# Patient Record
Sex: FEMALE | Race: WHITE | Employment: OTHER | URBAN - METROPOLITAN AREA
[De-identification: names, ages, dates, MRNs, and addresses within clinical notes are randomized per-mention and may not be internally consistent; named-entity substitution may affect disease eponyms.]

---

## 2018-01-16 PROBLEM — D35.02 ADENOMA OF LEFT ADRENAL GLAND: Status: ACTIVE | Noted: 2018-01-16

## 2018-01-16 PROBLEM — R91.8 MASS OF LEFT LUNG: Status: ACTIVE | Noted: 2018-01-16

## 2018-01-16 PROBLEM — N63.20 LEFT BREAST MASS: Status: ACTIVE | Noted: 2018-01-16

## 2018-01-16 PROBLEM — D35.01 ADRENAL ADENOMA, RIGHT: Status: ACTIVE | Noted: 2018-01-16

## 2018-01-16 PROBLEM — R94.2 ABNORMAL PFTS: Status: ACTIVE | Noted: 2018-01-16

## 2018-02-19 ENCOUNTER — OFFICE VISIT (OUTPATIENT)
Dept: PULMONOLOGY | Age: 48
End: 2018-02-19
Payer: MEDICARE

## 2018-02-19 VITALS
DIASTOLIC BLOOD PRESSURE: 68 MMHG | RESPIRATION RATE: 16 BRPM | HEART RATE: 95 BPM | HEIGHT: 65 IN | BODY MASS INDEX: 20.16 KG/M2 | OXYGEN SATURATION: 97 % | WEIGHT: 121 LBS | TEMPERATURE: 97.1 F | SYSTOLIC BLOOD PRESSURE: 94 MMHG

## 2018-02-19 VITALS — BODY MASS INDEX: 20.16 KG/M2 | HEART RATE: 95 BPM | WEIGHT: 121 LBS | OXYGEN SATURATION: 96 % | HEIGHT: 65 IN

## 2018-02-19 DIAGNOSIS — J44.9 STAGE 4 VERY SEVERE COPD BY GOLD CLASSIFICATION (HCC): Primary | ICD-10-CM

## 2018-02-19 DIAGNOSIS — J47.9 POST-INFECTIVE BRONCHIECTASIS (HCC): ICD-10-CM

## 2018-02-19 DIAGNOSIS — Z28.21 REFUSED PNEUMOCOCCAL VACCINATION: ICD-10-CM

## 2018-02-19 DIAGNOSIS — R06.02 SHORTNESS OF BREATH: ICD-10-CM

## 2018-02-19 DIAGNOSIS — R91.8 MASS OF LEFT LUNG: Primary | ICD-10-CM

## 2018-02-19 DIAGNOSIS — J44.9 CHRONIC OBSTRUCTIVE PULMONARY DISEASE, UNSPECIFIED COPD TYPE (HCC): ICD-10-CM

## 2018-02-19 DIAGNOSIS — F17.200 SMOKER: ICD-10-CM

## 2018-02-19 PROCEDURE — 3023F SPIROM DOC REV: CPT | Performed by: INTERNAL MEDICINE

## 2018-02-19 PROCEDURE — 94726 PLETHYSMOGRAPHY LUNG VOLUMES: CPT | Performed by: INTERNAL MEDICINE

## 2018-02-19 PROCEDURE — G8926 SPIRO NO PERF OR DOC: HCPCS | Performed by: INTERNAL MEDICINE

## 2018-02-19 PROCEDURE — 94729 DIFFUSING CAPACITY: CPT | Performed by: INTERNAL MEDICINE

## 2018-02-19 PROCEDURE — 4004F PT TOBACCO SCREEN RCVD TLK: CPT | Performed by: INTERNAL MEDICINE

## 2018-02-19 PROCEDURE — 3017F COLORECTAL CA SCREEN DOC REV: CPT | Performed by: INTERNAL MEDICINE

## 2018-02-19 PROCEDURE — G8427 DOCREV CUR MEDS BY ELIG CLIN: HCPCS | Performed by: INTERNAL MEDICINE

## 2018-02-19 PROCEDURE — 94060 EVALUATION OF WHEEZING: CPT | Performed by: INTERNAL MEDICINE

## 2018-02-19 PROCEDURE — G8420 CALC BMI NORM PARAMETERS: HCPCS | Performed by: INTERNAL MEDICINE

## 2018-02-19 PROCEDURE — 99205 OFFICE O/P NEW HI 60 MIN: CPT | Performed by: INTERNAL MEDICINE

## 2018-02-19 PROCEDURE — G8484 FLU IMMUNIZE NO ADMIN: HCPCS | Performed by: INTERNAL MEDICINE

## 2018-02-19 NOTE — PATIENT INSTRUCTIONS
CALLED [facility] IN [place] THEY HAVE PULMONARY REHAB AND THE ORDER, DICTATION AND PFT WAS FAXED TO [facility] [phone]. AND THE ORIGINAL REFERRAL WAS PROVIDED TO THE PATIENT AND THE PATIENT WAS INFORMED TO CALL [facility] AND ASK FOR PULMONARY DEPARTMENT IF SHE DOES NOT HEAR FROM THEM BY 2/23/18. MAIN PHONE NUMBER [phone]  ALSO CALLED [facility] AND SPOKE WITH ELLYN IN XRAY FILE ROOM TO SEND THE PATIENT'S CT AND CXR ON A [address] TO OUR OFFICE.

## 2018-02-20 NOTE — PROGRESS NOTES
Occupational history not working     TOBACCO:   reports that she has been smoking Cigarettes. She has been smoking about 0.25 packs per day. She has never used smokeless tobacco.  ETOH:   reports that she does not drink alcohol. ALLERGIES:      Allergies   Allergen Reactions    Aspirin Hives     Hives, lips swell    Other Hives     Patient is allergic to benedryl         Home Meds:   Prior to Admission medications    Medication Sig Start Date End Date Taking?  Authorizing Provider   Arformoterol Tartrate (BROVANA) 15 MCG/2ML NEBU Take 1 ampule by nebulization 2 times daily   Yes Historical Provider, MD   escitalopram (LEXAPRO) 20 MG tablet Take 20 mg by mouth daily   Yes Historical Provider, MD   budesonide (PULMICORT) 0.5 MG/2ML nebulizer suspension Take 2 mLs by nebulization 2 times daily 1/16/18 2/15/18  Danyelle Stein PA-C   ipratropium-albuterol (DUONEB) 0.5-2.5 (3) MG/3ML SOLN nebulizer solution Inhale 3 mLs into the lungs every 4 hours 1/16/18 2/15/18  Danyelle Stein PA-C   albuterol (PROVENTIL) (2.5 MG/3ML) 0.083% nebulizer solution Take 3 mLs by nebulization 4 times daily 1/16/18 2/15/18  Danyelle Stein PA-C   traZODone (DESYREL) 50 MG tablet Take 150 mg by mouth nightly     Historical Provider, MD              REVIEW OF SYSTEMS:    CONSTITUTIONAL:  negative for  fevers, chills, sweats, fatigue, malaise, anorexia and weight loss  EYES:  negative for  double vision, blurred vision, dry eyes, eye discharge and redness  HEENT:  negative for  hearing loss, tinnitus, ear drainage, earaches and nasal congestion  RESPIRATORY:  See hpi  CARDIOVASCULAR:  negative for  chest pain,, palpitations, orthopnea, PND  GASTROINTESTINAL:  negative for nausea, vomiting, change in bowel habits, diarrhea, constipation, abdominal pain, pruritus, abdominal mass and abdominal distention  GENITOURINARY:  negative for frequency, dysuria, nocturia, urinary incontinence and hesitancy  INTEGUMENT  negative Smoker          :                PLAN:       continue duo neb   Continue brovan and pulmicort   Pulmonary rehab   Ct images   Smoking cessation   Does not qualify for o2   Return 6 months       Requested Prescriptions      No prescriptions requested or ordered in this encounter       Medications Discontinued During This Encounter   Medication Reason    clindamycin (CLEOCIN) 300 MG capsule Discontinued by another clinician       Joanne received counseling on the following healthy behaviors: nutrition, exercise and medication adherence    Patient given educational materials : see patient instruction       Discussed use, benefit, and side effects of prescribed medications. Barriers to medication compliance addressed. All patient questions answered. Pt voiced understanding. I hope this updates you on my evaluation and clinical thinking. Thank you for allowing me to participate in his care. Sincerely,      Mini Ortega MD       Please note that this chart was generated using voice recognition Dragon dictation software. Although every effort was made to ensure the accuracy of this automated transcription, some errors in transcription may have occurred.

## 2018-02-22 ENCOUNTER — TELEPHONE (OUTPATIENT)
Dept: PULMONOLOGY | Age: 48
End: 2018-02-22

## 2018-07-14 PROBLEM — J44.9 COPD (CHRONIC OBSTRUCTIVE PULMONARY DISEASE) (HCC): Status: ACTIVE | Noted: 2018-07-14

## 2018-07-15 ENCOUNTER — HOSPITAL ENCOUNTER (INPATIENT)
Age: 48
LOS: 4 days | Discharge: HOME | DRG: 191 | End: 2018-07-19
Attending: INTERNAL MEDICINE | Admitting: INTERNAL MEDICINE
Payer: MEDICARE

## 2018-07-15 PROBLEM — J44.1 COPD EXACERBATION (HCC): Status: ACTIVE | Noted: 2018-07-14

## 2018-07-15 PROBLEM — R91.8 LUNG INFILTRATE ON CT: Status: ACTIVE | Noted: 2018-07-15

## 2018-07-15 PROBLEM — D75.839 THROMBOCYTOSIS: Status: ACTIVE | Noted: 2018-07-15

## 2018-07-15 PROBLEM — Z87.09 HX OF BRONCHIECTASIS: Status: ACTIVE | Noted: 2018-07-15

## 2018-07-15 PROBLEM — Z86.018 HISTORY OF ADRENAL ADENOMA: Status: ACTIVE | Noted: 2018-07-15

## 2018-07-15 PROBLEM — J47.0 BRONCHIECTASIS WITH ACUTE LOWER RESPIRATORY INFECTION (HCC): Status: ACTIVE | Noted: 2018-07-15

## 2018-07-15 LAB
ABSOLUTE EOS #: 0 K/UL (ref 0–0.4)
ABSOLUTE IMMATURE GRANULOCYTE: 0.71 K/UL (ref 0–0.3)
ABSOLUTE LYMPH #: 0.95 K/UL (ref 1–4.8)
ABSOLUTE MONO #: 0 K/UL (ref 0.1–0.8)
ANION GAP SERPL CALCULATED.3IONS-SCNC: 12 MMOL/L (ref 9–17)
BASOPHILS # BLD: 0 % (ref 0–2)
BASOPHILS ABSOLUTE: 0 K/UL (ref 0–0.2)
BUN BLDV-MCNC: 15 MG/DL (ref 6–20)
BUN/CREAT BLD: ABNORMAL (ref 9–20)
CALCIUM SERPL-MCNC: 8.5 MG/DL (ref 8.6–10.4)
CHLORIDE BLD-SCNC: 94 MMOL/L (ref 98–107)
CO2: 27 MMOL/L (ref 20–31)
CREAT SERPL-MCNC: 0.34 MG/DL (ref 0.5–0.9)
DIFFERENTIAL TYPE: ABNORMAL
DIRECT EXAM: NORMAL
EOSINOPHILS RELATIVE PERCENT: 0 % (ref 1–4)
FERRITIN: 395 UG/L (ref 13–150)
GFR AFRICAN AMERICAN: >60 ML/MIN
GFR NON-AFRICAN AMERICAN: >60 ML/MIN
GFR SERPL CREATININE-BSD FRML MDRD: ABNORMAL ML/MIN/{1.73_M2}
GFR SERPL CREATININE-BSD FRML MDRD: ABNORMAL ML/MIN/{1.73_M2}
GLUCOSE BLD-MCNC: 210 MG/DL (ref 70–99)
HCT VFR BLD CALC: 35.1 % (ref 36.3–47.1)
HEMOGLOBIN: 10.9 G/DL (ref 11.9–15.1)
IMMATURE GRANULOCYTES: 3 %
IRON SATURATION: 17 % (ref 20–55)
IRON: 34 UG/DL (ref 37–145)
LYMPHOCYTES # BLD: 4 % (ref 24–44)
Lab: NORMAL
MCH RBC QN AUTO: 28.4 PG (ref 25.2–33.5)
MCHC RBC AUTO-ENTMCNC: 31.1 G/DL (ref 28.4–34.8)
MCV RBC AUTO: 91.4 FL (ref 82.6–102.9)
MONOCYTES # BLD: 0 % (ref 1–7)
MORPHOLOGY: ABNORMAL
NRBC AUTOMATED: 0 PER 100 WBC
PDW BLD-RTO: 17 % (ref 11.8–14.4)
PLATELET # BLD: 829 K/UL (ref 138–453)
PLATELET ESTIMATE: ABNORMAL
PMV BLD AUTO: 9.2 FL (ref 8.1–13.5)
POTASSIUM SERPL-SCNC: 4.8 MMOL/L (ref 3.7–5.3)
RBC # BLD: 3.84 M/UL (ref 3.95–5.11)
RBC # BLD: ABNORMAL 10*6/UL
SEG NEUTROPHILS: 93 % (ref 36–66)
SEGMENTED NEUTROPHILS ABSOLUTE COUNT: 22.04 K/UL (ref 1.8–7.7)
SODIUM BLD-SCNC: 133 MMOL/L (ref 135–144)
SPECIMEN DESCRIPTION: NORMAL
STATUS: NORMAL
TOTAL IRON BINDING CAPACITY: 202 UG/DL (ref 250–450)
UNSATURATED IRON BINDING CAPACITY: 168 UG/DL (ref 112–347)
WBC # BLD: 23.7 K/UL (ref 3.5–11.3)
WBC # BLD: ABNORMAL 10*3/UL

## 2018-07-15 PROCEDURE — 36415 COLL VENOUS BLD VENIPUNCTURE: CPT

## 2018-07-15 PROCEDURE — 6370000000 HC RX 637 (ALT 250 FOR IP): Performed by: NURSE PRACTITIONER

## 2018-07-15 PROCEDURE — 6360000002 HC RX W HCPCS: Performed by: INTERNAL MEDICINE

## 2018-07-15 PROCEDURE — 94762 N-INVAS EAR/PLS OXIMTRY CONT: CPT

## 2018-07-15 PROCEDURE — 2580000003 HC RX 258: Performed by: NURSE PRACTITIONER

## 2018-07-15 PROCEDURE — 99223 1ST HOSP IP/OBS HIGH 75: CPT | Performed by: INTERNAL MEDICINE

## 2018-07-15 PROCEDURE — 87070 CULTURE OTHR SPECIMN AEROBIC: CPT

## 2018-07-15 PROCEDURE — 97161 PT EVAL LOW COMPLEX 20 MIN: CPT

## 2018-07-15 PROCEDURE — 85025 COMPLETE CBC W/AUTO DIFF WBC: CPT

## 2018-07-15 PROCEDURE — 2060000000 HC ICU INTERMEDIATE R&B

## 2018-07-15 PROCEDURE — G8978 MOBILITY CURRENT STATUS: HCPCS

## 2018-07-15 PROCEDURE — 80048 BASIC METABOLIC PNL TOTAL CA: CPT

## 2018-07-15 PROCEDURE — 97116 GAIT TRAINING THERAPY: CPT

## 2018-07-15 PROCEDURE — 83550 IRON BINDING TEST: CPT

## 2018-07-15 PROCEDURE — 6370000000 HC RX 637 (ALT 250 FOR IP): Performed by: INTERNAL MEDICINE

## 2018-07-15 PROCEDURE — 99222 1ST HOSP IP/OBS MODERATE 55: CPT | Performed by: INTERNAL MEDICINE

## 2018-07-15 PROCEDURE — 82728 ASSAY OF FERRITIN: CPT

## 2018-07-15 PROCEDURE — 94640 AIRWAY INHALATION TREATMENT: CPT

## 2018-07-15 PROCEDURE — 87205 SMEAR GRAM STAIN: CPT

## 2018-07-15 PROCEDURE — 97110 THERAPEUTIC EXERCISES: CPT

## 2018-07-15 PROCEDURE — 87040 BLOOD CULTURE FOR BACTERIA: CPT

## 2018-07-15 PROCEDURE — 6360000002 HC RX W HCPCS: Performed by: NURSE PRACTITIONER

## 2018-07-15 PROCEDURE — 2580000003 HC RX 258: Performed by: INTERNAL MEDICINE

## 2018-07-15 PROCEDURE — G8979 MOBILITY GOAL STATUS: HCPCS

## 2018-07-15 PROCEDURE — 83540 ASSAY OF IRON: CPT

## 2018-07-15 RX ORDER — ACETAMINOPHEN 325 MG/1
650 TABLET ORAL EVERY 4 HOURS PRN
Status: DISCONTINUED | OUTPATIENT
Start: 2018-07-15 | End: 2018-07-19 | Stop reason: HOSPADM

## 2018-07-15 RX ORDER — ALBUTEROL SULFATE 2.5 MG/3ML
2.5 SOLUTION RESPIRATORY (INHALATION)
Status: DISCONTINUED | OUTPATIENT
Start: 2018-07-15 | End: 2018-07-15

## 2018-07-15 RX ORDER — IPRATROPIUM BROMIDE AND ALBUTEROL SULFATE 2.5; .5 MG/3ML; MG/3ML
1 SOLUTION RESPIRATORY (INHALATION) 4 TIMES DAILY
Status: DISCONTINUED | OUTPATIENT
Start: 2018-07-15 | End: 2018-07-19 | Stop reason: HOSPADM

## 2018-07-15 RX ORDER — LORAZEPAM 0.5 MG/1
0.5 TABLET ORAL ONCE
Status: COMPLETED | OUTPATIENT
Start: 2018-07-15 | End: 2018-07-15

## 2018-07-15 RX ORDER — FORMOTEROL FUMARATE 20 UG/2ML
20 SOLUTION RESPIRATORY (INHALATION) 2 TIMES DAILY
Status: DISCONTINUED | OUTPATIENT
Start: 2018-07-15 | End: 2018-07-19 | Stop reason: HOSPADM

## 2018-07-15 RX ORDER — METHYLPREDNISOLONE SODIUM SUCCINATE 125 MG/2ML
80 INJECTION, POWDER, LYOPHILIZED, FOR SOLUTION INTRAMUSCULAR; INTRAVENOUS EVERY 8 HOURS
Status: DISCONTINUED | OUTPATIENT
Start: 2018-07-15 | End: 2018-07-15

## 2018-07-15 RX ORDER — ONDANSETRON 2 MG/ML
4 INJECTION INTRAMUSCULAR; INTRAVENOUS EVERY 6 HOURS PRN
Status: DISCONTINUED | OUTPATIENT
Start: 2018-07-15 | End: 2018-07-19 | Stop reason: HOSPADM

## 2018-07-15 RX ORDER — ALBUTEROL SULFATE 2.5 MG/3ML
2.5 SOLUTION RESPIRATORY (INHALATION) EVERY 4 HOURS PRN
Status: DISCONTINUED | OUTPATIENT
Start: 2018-07-15 | End: 2018-07-15

## 2018-07-15 RX ORDER — NICOTINE 21 MG/24HR
1 PATCH, TRANSDERMAL 24 HOURS TRANSDERMAL DAILY PRN
Status: DISCONTINUED | OUTPATIENT
Start: 2018-07-15 | End: 2018-07-19 | Stop reason: HOSPADM

## 2018-07-15 RX ORDER — IPRATROPIUM BROMIDE AND ALBUTEROL SULFATE 2.5; .5 MG/3ML; MG/3ML
1 SOLUTION RESPIRATORY (INHALATION)
Status: DISCONTINUED | OUTPATIENT
Start: 2018-07-15 | End: 2018-07-15

## 2018-07-15 RX ORDER — ALBUTEROL SULFATE 2.5 MG/3ML
2.5 SOLUTION RESPIRATORY (INHALATION) 2 TIMES DAILY
Status: DISCONTINUED | OUTPATIENT
Start: 2018-07-16 | End: 2018-07-18

## 2018-07-15 RX ORDER — IPRATROPIUM BROMIDE AND ALBUTEROL SULFATE 2.5; .5 MG/3ML; MG/3ML
1 SOLUTION RESPIRATORY (INHALATION) 4 TIMES DAILY
Status: DISCONTINUED | OUTPATIENT
Start: 2018-07-15 | End: 2018-07-15

## 2018-07-15 RX ORDER — SODIUM CHLORIDE 9 MG/ML
INJECTION, SOLUTION INTRAVENOUS CONTINUOUS
Status: DISCONTINUED | OUTPATIENT
Start: 2018-07-15 | End: 2018-07-19 | Stop reason: HOSPADM

## 2018-07-15 RX ORDER — ESCITALOPRAM OXALATE 10 MG/1
20 TABLET ORAL DAILY
Status: DISCONTINUED | OUTPATIENT
Start: 2018-07-15 | End: 2018-07-19 | Stop reason: HOSPADM

## 2018-07-15 RX ORDER — ALBUTEROL SULFATE 2.5 MG/3ML
2.5 SOLUTION RESPIRATORY (INHALATION) EVERY 6 HOURS PRN
Status: DISCONTINUED | OUTPATIENT
Start: 2018-07-15 | End: 2018-07-19 | Stop reason: HOSPADM

## 2018-07-15 RX ORDER — METHYLPREDNISOLONE SODIUM SUCCINATE 40 MG/ML
40 INJECTION, POWDER, LYOPHILIZED, FOR SOLUTION INTRAMUSCULAR; INTRAVENOUS EVERY 8 HOURS
Status: DISCONTINUED | OUTPATIENT
Start: 2018-07-15 | End: 2018-07-19 | Stop reason: HOSPADM

## 2018-07-15 RX ORDER — FAMOTIDINE 20 MG/1
20 TABLET, FILM COATED ORAL 2 TIMES DAILY
Status: DISCONTINUED | OUTPATIENT
Start: 2018-07-15 | End: 2018-07-19 | Stop reason: HOSPADM

## 2018-07-15 RX ADMIN — FAMOTIDINE 20 MG: 20 TABLET, FILM COATED ORAL at 20:49

## 2018-07-15 RX ADMIN — IPRATROPIUM BROMIDE AND ALBUTEROL SULFATE 1 AMPULE: .5; 3 SOLUTION RESPIRATORY (INHALATION) at 08:34

## 2018-07-15 RX ADMIN — LORAZEPAM 0.5 MG: 0.5 TABLET ORAL at 22:22

## 2018-07-15 RX ADMIN — METHYLPREDNISOLONE SODIUM SUCCINATE 80 MG: 125 INJECTION, POWDER, FOR SOLUTION INTRAMUSCULAR; INTRAVENOUS at 10:04

## 2018-07-15 RX ADMIN — FAMOTIDINE 20 MG: 20 TABLET, FILM COATED ORAL at 09:13

## 2018-07-15 RX ADMIN — IPRATROPIUM BROMIDE AND ALBUTEROL SULFATE 1 AMPULE: .5; 3 SOLUTION RESPIRATORY (INHALATION) at 20:19

## 2018-07-15 RX ADMIN — METHYLPREDNISOLONE SODIUM SUCCINATE 80 MG: 125 INJECTION, POWDER, FOR SOLUTION INTRAMUSCULAR; INTRAVENOUS at 03:19

## 2018-07-15 RX ADMIN — FORMOTEROL FUMARATE DIHYDRATE 20 MCG: 20 SOLUTION RESPIRATORY (INHALATION) at 11:47

## 2018-07-15 RX ADMIN — IPRATROPIUM BROMIDE AND ALBUTEROL SULFATE 1 AMPULE: .5; 3 SOLUTION RESPIRATORY (INHALATION) at 15:54

## 2018-07-15 RX ADMIN — IPRATROPIUM BROMIDE AND ALBUTEROL SULFATE 1 AMPULE: .5; 3 SOLUTION RESPIRATORY (INHALATION) at 11:47

## 2018-07-15 RX ADMIN — FORMOTEROL FUMARATE DIHYDRATE 20 MCG: 20 SOLUTION RESPIRATORY (INHALATION) at 20:19

## 2018-07-15 RX ADMIN — SODIUM CHLORIDE 3 G: 900 INJECTION INTRAVENOUS at 22:08

## 2018-07-15 RX ADMIN — SODIUM CHLORIDE: 9 INJECTION, SOLUTION INTRAVENOUS at 03:15

## 2018-07-15 RX ADMIN — METHYLPREDNISOLONE SODIUM SUCCINATE 40 MG: 125 INJECTION, POWDER, FOR SOLUTION INTRAMUSCULAR; INTRAVENOUS at 18:43

## 2018-07-15 RX ADMIN — ESCITALOPRAM 20 MG: 10 TABLET, FILM COATED ORAL at 09:14

## 2018-07-15 RX ADMIN — ALBUTEROL SULFATE 2.5 MG: 2.5 SOLUTION RESPIRATORY (INHALATION) at 23:23

## 2018-07-15 RX ADMIN — SODIUM CHLORIDE 3 G: 900 INJECTION INTRAVENOUS at 13:56

## 2018-07-15 RX ADMIN — ENOXAPARIN SODIUM 40 MG: 100 INJECTION SUBCUTANEOUS at 13:58

## 2018-07-15 NOTE — PROGRESS NOTES
Admission  Patient admitted to room 0422. Patient connected to monitor. Vitals taken. Patient hemodynamically stable. Patient oriented to room. Continue to monitor.

## 2018-07-15 NOTE — PLAN OF CARE
Problem: Falls - Risk of:  Goal: Will remain free from falls  Will remain free from falls   Outcome: Met This Shift  Patient assessed for fall risk and fall precautions initiated as needed. Patient and family instructed about safety devices and allowed to make decisions related to safey. Environment kept free of clutter and adequate lighting provided. Bed in lowest position with brakes locked. Call light in reach. Patient ID band correct and in place. Patient transferred with appropriate methods. Will continue to monitor.   Kimberli Ling RN

## 2018-07-15 NOTE — PROGRESS NOTES
Sandra Zamora, PPatient Assessment complete. COPD (chronic obstructive pulmonary disease) ([facility] [J44.9] . There were no vitals filed for this visit. . Patients home meds are   Prior to Admission medications    Medication Sig Start Date End Date Taking? Authorizing Provider   lurasidone (LATUDA) 40 MG TABS tablet Take 40 mg by mouth daily   Yes Historical Provider, MD   escitalopram (LEXAPRO) 20 MG tablet Take 20 mg by mouth daily   Yes Historical Provider, MD   albuterol (PROVENTIL) (2.5 MG/3ML) 0.083% nebulizer solution Take 3 mLs by nebulization 4 times daily 5/9/18 6/8/18  Rodney Bob MD   budesonide (PULMICORT) 0.5 MG/2ML nebulizer suspension Take 2 mLs by nebulization 2 times daily 5/9/18 6/8/18  Rodney Bob MD   ipratropium-albuterol (DUONEB) 0.5-2.5 (3) MG/3ML SOLN nebulizer solution Inhale 3 mLs into the lungs every 4 hours 5/9/18 6/8/18  Rodney Bob MD   Arformoterol Tartrate (BROVANA) 15 MCG/2ML NEBU Take 1 ampule by nebulization 2 times daily    Historical Provider, MD   traZODone (DESYREL) 50 MG tablet Take 150 mg by mouth nightly     Historical Provider, MD   .  Assessment   Pt resting comfortably and in no distress.  Pt has a HX of COPD Pt states she takes brovana, and albuterol at home    RR 16  Breath Sounds: cl      · Bronchodilator assessment at level  3, home meds  · Hyperinflation assessment at level   · Secretion Management assessment at level    ·   · [x]    Bronchodilator Assessment  BRONCHODILATOR ASSESSMENT SCORE  Score 0 1 2 3 4 5   Breath Sounds   []  Patient Baseline [x]  No Wheeze good aeration []  Faint, scattered wheezing, good aeration []  Expiratory Wheezing and or moderately diminished []  Insp/Exp wheeze and/or very diminished []  Insp/Exp and/ or marked distress   Respiratory Rate   []  Patient Baseline [x]  Less than 20 [x]  Less than 20 []  20-25 []  Greater than 25 []  Greater than 25   Peak flow % of Pred or PB [x]  NA   []  Greater than 90%  []  81-90% []  71-80% []  Less than or equal to 70%  or unable to perform []  Unable due to Respiratory Distress   Dyspnea re []  Patient Baseline [x]  No SOB []  No SOB []  SOB on exertion []  SOB min activity []  At rest/acute   e FEV% Predicted       [x]  NA []  Above 69%  []  Unable []  Above 60-69%  []  Unable []  Above 50-59%  []  Unable []  Above 35-49%  []  Unable []  Less than 35%  []  Unable                 []  Hyperinflation Assessment  Score 1 2 3   CXR and Breath Sounds   []  Clear []  No atelectasis  Basilar aeration []  Atelectasis or absent basilar breath sounds   Incentive Spirometry Volume  (Per IBW)   []  Greater than or equal to 15ml/Kg []  less than 15ml/Kg []  less than 15ml/Kg   Surgery within last 2 weeks []  None or general   []  Abdominal or thoracic surgery  []  Abdominal or thoracic   Chronic Pulmonary Historyre []  No []  Yes []  Yes     []  Secretion Management Assessment  Score 1 2 3   Bilateral Breath Sounds   []  Occasional Rhonchi []  Scattered Rhonchi []  Course Rhonchi and/or poor aeration   Sputum    []  Small amount of thin secretions []  Moderate amount of viscous secretions []  Copius, Viscious Yellow/ Secretions   CXR as reported by physician []  clear  []  Unavailable []  Infiltrates and/or consolidation  []  Unavailable []  Mucus Plugging and or lobar consolidation  []  Unavailable   Cough []  Strong, productive cough []  Weak productive cough []  No cough or weak non-productive cough   Sandra Zamora  4:22 AM                            FEMALE                                  MALE                            FEV1 Predicted Normal Values                        FEV1 Predicted Normal Values          Age                                     Height in Feet and Inches       Age                                     Height in Feet and Inches       4' 11\" 5' 1\" 5' 3\" 5' 5\" 5' 7\" 5' 9\" 5' 11\" 6' 1\"  4' 11\" 5' 1\" 5' 3\" 5' 5\" 5' 7\" 5' 9\" 5' 11\" 6' 1\"   42 - 45 2.49 2.66 2.84 3.03 3.22 3.42 3.62 3.83 42 - 45

## 2018-07-15 NOTE — H&P
MG/3ML) 0.083% nebulizer solution Take 3 mLs by nebulization 4 times daily 5/9/18 6/8/18  Jono Shi MD   budesonide (PULMICORT) 0.5 MG/2ML nebulizer suspension Take 2 mLs by nebulization 2 times daily 5/9/18 6/8/18  Jono Shi MD   ipratropium-albuterol (DUONEB) 0.5-2.5 (3) MG/3ML SOLN nebulizer solution Inhale 3 mLs into the lungs every 4 hours 5/9/18 6/8/18  Jono Shi MD   Arformoterol Tartrate (BROVANA) 15 MCG/2ML NEBU Take 1 ampule by nebulization 2 times daily    Historical Provider, MD   traZODone (DESYREL) 50 MG tablet Take 150 mg by mouth nightly     Historical Provider, MD        Allergies:     Aspirin and Benadryl [diphenhydramine]    Social History:     Tobacco:    reports that she has been smoking Cigarettes. She has been smoking about 0.25 packs per day. She has never used smokeless tobacco.  Alcohol:      reports that she does not drink alcohol. Drug Use:  reports that she does not use drugs. Family History:     Family History   Problem Relation Age of Onset    Cancer Mother 58        Small cell Lung. Passed age 58, smoker     Review of Systems:     Positive and Negative as described in HPI. CONSTITUTIONAL:  negative for fevers, chills, sweats, fatigue, weight loss  HEENT:  negative for vision, hearing changes, runny nose, throat pain  RESPIRATORY: has SOB and coughing.   CARDIOVASCULAR:  negative for chest pain, palpitations  GASTROINTESTINAL:  negative for nausea, vomiting, diarrhea, constipation, change in bowel habits, abdominal pain   GENITOURINARY:  negative for difficulty of urination, burning with urination, frequency   INTEGUMENT:  negative for rash, skin lesions, easy bruising   HEMATOLOGIC/LYMPHATIC:  negative for swelling/edema   ALLERGIC/IMMUNOLOGIC:  negative for urticaria , itching  ENDOCRINE:  negative increase in drinking, increase in urination, hot or cold intolerance  MUSCULOSKELETAL:  negative joint pains, muscle aches, swelling of joints  NEUROLOGICAL:

## 2018-07-15 NOTE — PROGRESS NOTES
Jose  Occupational Therapy Not Seen Note    DATE: 7/15/2018  Name: Daniel Bellamy  : 1970  MRN: [id number]    Patient not available for Occupational Therapy due to:    [] Testing:    [] Hemodialysis    [] Blood Transfusion in Progress    []Refusal by Patient:    [] Surgery/Procedure:    [] Strict Bedrest    [] Sedation    [] Spine Precautions     [] Pt being transferred to palliative care at this time. Spoke with pt/family and OT services to be defered. [x] Pt independent with functional mobility and functional tasks. Pt with no OT acute care needs at this time, will defer OT eval. RN informed.      [] Other    Next Scheduled Treatment: n/a    Signature: Electronically signed by Thania Ortiz OT on 7/15/2018 at 12:31 PM

## 2018-07-15 NOTE — PROGRESS NOTES
assistance  Stand to sit: Contact guard assistance     Ambulation  Ambulation?: Yes  Ambulation 1  Surface: level tile  Device: No Device  Assistance: Contact guard assistance  Distance: 50ft  Comments: SpO2 after gait 87%, recovered with supplemental O2. Reports LE fatigue with gait. Balance  Sitting - Dynamic: Good  Standing - Static: Good  Standing - Dynamic: Good;Fair        Assessment   Assessment: Pt is 45 y/o female with difficulty walking due to SOB and fatigue; will benefit from PT. Treatment Diagnosis: general weakness  Prognosis: Good  Decision Making: Low Complexity  Patient Education: PT eval and POC  REQUIRES PT FOLLOW UP: Yes  Activity Tolerance  Activity Tolerance: Patient Tolerated treatment well         Plan   Plan  Times per week: 5 x week  Times per day: Daily  Current Treatment Recommendations: Strengthening, Balance Training, Functional Mobility Training, Transfer Training, Stair training, Gait Training, Endurance Training, Safety Education & Training, Home Exercise Program, Neuromuscular Re-education  Safety Devices  Type of devices: All fall risk precautions in place    G-Code  PT G-Codes  Functional Assessment Tool Used: [place] Tool  Score: 18  Functional Limitation: Mobility: Walking and moving around  Mobility: Walking and Moving Around Current Status (): At least 20 percent but less than 40 percent impaired, limited or restricted  Mobility: Walking and Moving Around Goal Status (): At least 1 percent but less than 20 percent impaired, limited or restricted                 AM-PAC Score  AM-PAC Inpatient Mobility without Stair Climbing Raw Score : 18  AM-PAC Inpatient without Stair Climbing T-Scale Score : 51.97  Mobility Inpatient CMS 0-100% Score: 23.26  Mobility Inpatient without Stair CMS G-Code Modifier : CJ       Goals  Short term goals  Time Frame for Short term goals: 14 visits  Short term goal 1: Pt to ambulate 300ft without AD and no LOB.    Short term goal 2: Pt to demonstrate good dynamic standing balance. Short term goal 3: Pt to tolerate 20-30 mins ther ex/act for improved strength and endurance. Short term goal 4: Pt to ascend/descend one flight of stairs to prepare for safe return home.    Patient Goals   Patient goals : Home following discharge       Therapy Time   Individual Concurrent Group Co-treatment   Time In 1351         Time Out 1429         Minutes 45                 Millie Dave, PT, DPT, CMPT

## 2018-07-15 NOTE — PLAN OF CARE
Problem: RESPIRATORY  Intervention: Respiratory assessment  DANAE MOE, PPatient Assessment complete. COPD (chronic obstructive pulmonary disease) ([facility] [J44.9] . Vitals:    07/15/18 1203   BP: (!) 100/57   Pulse: 90   Resp: 20   Temp: 96.7 °F (35.9 °C)   SpO2: 92%   . Patients home meds are   Prior to Admission medications    Medication Sig Start Date End Date Taking? Authorizing Provider   lurasidone (LATUDA) 40 MG TABS tablet Take 40 mg by mouth daily   Yes Historical Provider, MD   escitalopram (LEXAPRO) 20 MG tablet Take 20 mg by mouth daily   Yes Historical Provider, MD   albuterol (PROVENTIL) (2.5 MG/3ML) 0.083% nebulizer solution Take 3 mLs by nebulization 4 times daily 5/9/18 6/8/18  Arleth Cross MD   budesonide (PULMICORT) 0.5 MG/2ML nebulizer suspension Take 2 mLs by nebulization 2 times daily 5/9/18 6/8/18  Arleth Cross MD   ipratropium-albuterol (DUONEB) 0.5-2.5 (3) MG/3ML SOLN nebulizer solution Inhale 3 mLs into the lungs every 4 hours 5/9/18 6/8/18  Arleth Cross MD   Arformoterol Tartrate (BROVANA) 15 MCG/2ML NEBU Take 1 ampule by nebulization 2 times daily    Historical Provider, MD   traZODone (DESYREL) 50 MG tablet Take 150 mg by mouth nightly     Historical Provider, MD   .    Assessment     Admit for Pneumonia exacerbation copd  Home meds reviewed. Had issue with congestion. Green yellow sputum Will give acaplla  History of Cavitary Lesions requiring lobectomy    no home oxygen.   Will increase freq of treatments    V1/FVC defer      RR 26    Breath Sounds: wheeze      · Bronchodilator assessment at level  4  · Hyperinflation assessment at level   · Secretion Management assessment at level    ·   · [x]    Bronchodilator Assessment  BRONCHODILATOR ASSESSMENT SCORE  Score 0 1 2 3 4 5   Breath Sounds   []  Patient Baseline []  No Wheeze good aeration []  Faint, scattered wheezing, good aeration []  Expiratory Wheezing and or moderately diminished [x]  Insp/Exp wheeze and/or very

## 2018-07-15 NOTE — PLAN OF CARE
Problem: Falls - Risk of:  Goal: Will remain free from falls  Will remain free from falls   Outcome: Met This Shift  Patient remained free of falls during shift. Bed in lowest position. 2/4 side rails up. Bed breaks locked. Call light and bed side table within reach. Patient calls out appropriately. Continue to monitor.

## 2018-07-15 NOTE — CONSULTS
Inpatient consult to Pulmonology  Consult performed by: Lizzette Gautam ordered by: Mariel Al            Patient Perlita Hidalgo   MRN -  [id number]   Acct # - [de-identified]   - 1970        Date of evaluation -  7/15/2018    REASON FOR THE CONSULTATION:  Pneumonia   HISTORY OF PRESENT ILLNESS:    Steve Aldana is a 46y.o. year old female here for evaluation of worsening fatigue fever sob , cough with green sputum with blood tinged she is also wheezing . She is not on home o2 now is needing 2 l . She has been weak and fatigued since she returned from [address] [place] [address] . She has previous bronchiectasis . Immunization     There is no immunization history on file for this patient. Pneumococcal Vaccine     [] Up to date    [x] Indicated   [] Refused  [] Contraindicated       Influenza Vaccine   [] Up to date    [x] Indicated   [] Refused  [] Contraindicated                PAST MEDICAL HISTORY:       Diagnosis Date    Abnormal PFTs 2018    Adenoma of left adrenal gland 2018    Adrenal adenoma, right 2018    Cavitary pneumonia 10/2016    Right lung    COPD (chronic obstructive pulmonary disease) (HCC)     Mass of left lung 2018    Smoker     3 ppd since 14yr of age         Family History:   Family History   Problem Relation Age of Onset    Cancer Mother 58        Small cell Lung. Passed age 58, smoker       SURGICAL HISTORY:   Past Surgical History:   Procedure Laterality Date    BREAST BIOPSY      \"S\" indicator in left breast to monitor    TONSILLECTOMY      TUBAL LIGATION             SOCIAL AND OCCUPATIONAL HEALTH:  The patient is a Current smoker of @year@. Pack year equal ____. There  is not history of TB or TB exposure. There is not asbestos or silica dust exposure. The patient reports does not have coal, foundry, quarry or [employer] exposure. Travel history reveals No.  There is not  history of recreational or IV drug use.  There is not hot tube exposure. The patient does not bird    Occupational history :individual, not currently working     TOBACCO:   reports that she has been smoking Cigarettes. She has been smoking about 0.25 packs per day. She has never used smokeless tobacco.  ETOH:   reports that she does not drink alcohol. ALLERGIES:    Allergies   Allergen Reactions    Aspirin Anaphylaxis and Hives     Hives, lips swell    Benadryl [Diphenhydramine] Anaphylaxis       Home Meds:   Prior to Admission medications    Medication Sig Start Date End Date Taking?  Authorizing Provider   lurasidone (LATUDA) 40 MG TABS tablet Take 40 mg by mouth daily   Yes Historical Provider, MD   escitalopram (LEXAPRO) 20 MG tablet Take 20 mg by mouth daily   Yes Historical Provider, MD   albuterol (PROVENTIL) (2.5 MG/3ML) 0.083% nebulizer solution Take 3 mLs by nebulization 4 times daily 5/9/18 6/8/18  Rodney Bob MD   budesonide (PULMICORT) 0.5 MG/2ML nebulizer suspension Take 2 mLs by nebulization 2 times daily 5/9/18 6/8/18  Rodney Bob MD   ipratropium-albuterol (DUONEB) 0.5-2.5 (3) MG/3ML SOLN nebulizer solution Inhale 3 mLs into the lungs every 4 hours 5/9/18 6/8/18  Rodney Bob MD   Arformoterol Tartrate (BROVANA) 15 MCG/2ML NEBU Take 1 ampule by nebulization 2 times daily    Historical Provider, MD   traZODone (DESYREL) 50 MG tablet Take 150 mg by mouth nightly     Historical Provider, MD     CURRENT MEDS :  Scheduled Meds:   formoterol  20 mcg Nebulization BID    escitalopram  20 mg Oral Daily    traZODone  150 mg Oral Nightly    famotidine  20 mg Oral BID    enoxaparin  40 mg Subcutaneous Daily    methylPREDNISolone  40 mg Intravenous Q8H    ampicillin-sulbactam  3 g Intravenous Q8H    ipratropium-albuterol  1 ampule Inhalation 4x daily    [START ON 7/16/2018] albuterol  2.5 mg Nebulization BID       Continuous Infusions:   sodium chloride 75 mL/hr at 07/15/18 0315           REVIEW OF SYSTEMS:  CONSTITUTIONAL:  negative for fevers, chills, sweats, fatigue, malaise, anorexia and weight loss  EYES:  negative for  double vision, blurred vision, dry eyes, eye discharge and redness  HEENT:  negative for  hearing loss, tinnitus, ear drainage, earaches and nasal congestion  RESPIRATORY:  See hpi  CARDIOVASCULAR:  negative for  chest pain, palpitations, orthopnea, PND  GASTROINTESTINAL:  negative for nausea, vomiting, change in bowel habits, diarrhea, constipation, abdominal pain, pruritus, abdominal mass and abdominal distention  GENITOURINARY:  negative for frequency, dysuria, nocturia, urinary incontinence and hesitancy  INTEGUMENT/BREAST:  negative for rash, skin lesion(s), dryness, skin color change, changes in lesion, pruritus and changes in hair  HEMATOLOGIC/LYMPHATIC:  negative for easy bruising, bleeding, lymphadenopathy, petechiae and swelling/edema  ALLERGIC/IMMUNOLOGIC:  negative for recurrent infections, urticaria and drug reactions  ENDOCRINE:  negative for heat intolerance, cold intolerance, tremor, weight changes and change in bowel habits  MUSCULOSKELETAL:  negative for  myalgias, arthralgias, pain, joint swelling, stiff joints and decreased range of motion  NEUROLOGICAL:  negative for headaches, dizziness, seizures, memory problems, speech problems, visual disturbance and coordination problems  BEHAVIOR/PSYCH:  negative for poor appetite, increased appetite, decreased sleep, increased sleep, decreased energy level, increased energy level and poor concentration   skin - no rash no dermatitis     Vitals:  /66   Pulse 82   Temp 96.9 °F (36.1 °C) (Temporal)   Resp 19   SpO2 100%     PHYSICAL EXAM:  General Appearance:    Alert, cooperative, mild  distress, appears stated age   Head:    Normocephalic, without obvious abnormality, atraumatic      Eyes:    PERRL, conjunctiva/corneas clear, EOM's intact   Ears:    Normal TM's and external ear canals, both ears   Nose:   Nares normal, septum midline, mucosa normal, no for input(s): INR in the last 72 hours. Thyroid:   Lab Results   Component Value Date    TSH 1.63 01/12/2018     Urinalysis: No results for input(s): BACTERIA, BLOODU, CLARITYU, COLORU, PHUR, PROTEINU, RBCUA, SPECGRAV, BILIRUBINUR, NITRU, WBCUA, LEUKOCYTESUR, GLUCOSEU in the last 72 hours. Cultures:-  Pending     CXR  B/l pulmonary infiltrates     CT Scans  Reviewed       IMPRESSION:    Patient Active Problem List   Diagnosis Code    Adrenal adenoma, right D35.01    Adenoma of left adrenal gland D35.02    Mass of left lung R91.8    Abnormal PFTs R94.2    Left breast mass N63.20    COPD exacerbation (HCC) J44.1    Lung infiltrate on CT R91.8    Hx of bronchiectasis Z87.09    History of adrenal adenoma Z86.2    Thrombocytosis (HCC) D47.3    Bronchiectasis with acute lower respiratory infection ([address] J47.0             Principal Problem:    Bronchiectasis with acute lower respiratory infection (HCC)  Active Problems:    COPD exacerbation (HCC)    Lung infiltrate on CT    Hx of bronchiectasis    History of adrenal adenoma    Thrombocytosis (HCC)  Resolved Problems:    * No resolved hospital problems. *       PLAN:        She is known to me as follows in office has left  Lung saccular bronchiectasis and left pleural thickening from previous mssa infection . cta now shows b/l tree in bud appearence and also filling of bronchiectatic segments and consolidation . She has bronchiectasis with lower respiratory tract infection with reactive lap   Will start Unasyn and get sputum and BC   Cont iv steroids and change to po in am   Will need 4 weeks of antibiotics and repeat ct chest in 4 weeks         I hope this updates you on my evaluation and clinical thinking. Thank you for allowing me to participate in his care.      Sincerely,      Electronically signed by Estelle Lozano MD on 7/15/2018 at 6:03 PM

## 2018-07-15 NOTE — CARE COORDINATION
Case Management Initial Discharge Plan  [facility],         Readmission Risk              Risk of Unplanned Readmission:        8               Met with:patient to discuss discharge plans. Information verified: address, contacts, phone number, , insurance Yes  PCP: Mary Ellen Lawrence MD  Date of last visit:     Insurance Provider: medicare/medicaid    Discharge Planning    Living Arrangements:   (Joelene List)   Support Systems:  Family Members    Home has  stories   stairs to climb to get into front door, stairs to climb to reach second floor  Location of bedroom/bathroom in home     Patient able to perform ADL's:Independent    Current Services (outpatient & in home) none  DME equipment:   DME provider:     Pharmacy: [facility] Medications:  No  Does patient want to participate in local refill/ meds to beds program?  No    Potential Assistance Needed:  N/A    Patient agreeable to home care: No  [place] of choice provided:  n/a    Prior SNF/Rehab Placement and Facility: no  Agreeable to SNF/Rehab: No  [place] of choice provided: n/a   Evaluation: n/a    Expected Discharge date:  18  Patient expects to be discharged to:  home  Follow Up Appointment: Best Day/ Time: Monday PM    Transportation provider: family  Transportation arrangements needed for discharge: No    Discharge Plan: Pt states she lives with her aunt. No DME currently at home. Is on O2 NC-monitor for home O2 needs. Home with family.         Electronically signed by Jose Jang RN on 7/15/18 at 10:57 AM

## 2018-07-16 ENCOUNTER — APPOINTMENT (OUTPATIENT)
Dept: GENERAL RADIOLOGY | Age: 48
DRG: 191 | End: 2018-07-16
Attending: INTERNAL MEDICINE
Payer: MEDICARE

## 2018-07-16 LAB
ABSOLUTE EOS #: 0 K/UL (ref 0–0.4)
ABSOLUTE IMMATURE GRANULOCYTE: 0.83 K/UL (ref 0–0.3)
ABSOLUTE LYMPH #: 1.66 K/UL (ref 1–4.8)
ABSOLUTE MONO #: 0.28 K/UL (ref 0.1–0.8)
ABSOLUTE RETIC #: 0.04 M/UL (ref 0.03–0.08)
BASOPHILS # BLD: 0 % (ref 0–2)
BASOPHILS ABSOLUTE: 0 K/UL (ref 0–0.2)
DIFFERENTIAL TYPE: ABNORMAL
EOSINOPHILS RELATIVE PERCENT: 0 % (ref 1–4)
HCT VFR BLD CALC: 33 % (ref 36.3–47.1)
HEMOGLOBIN: 10.3 G/DL (ref 11.9–15.1)
IMMATURE GRANULOCYTES: 3 %
IMMATURE RETIC FRACT: 30.5 % (ref 2.7–18.3)
LYMPHOCYTES # BLD: 6 % (ref 24–44)
MCH RBC QN AUTO: 28.3 PG (ref 25.2–33.5)
MCHC RBC AUTO-ENTMCNC: 31.2 G/DL (ref 28.4–34.8)
MCV RBC AUTO: 90.7 FL (ref 82.6–102.9)
MONOCYTES # BLD: 1 % (ref 1–7)
MORPHOLOGY: ABNORMAL
NRBC AUTOMATED: 0 PER 100 WBC
PDW BLD-RTO: 16.7 % (ref 11.8–14.4)
PLATELET # BLD: 807 K/UL (ref 138–453)
PLATELET ESTIMATE: ABNORMAL
PMV BLD AUTO: 8.9 FL (ref 8.1–13.5)
RBC # BLD: 3.64 M/UL (ref 3.95–5.11)
RBC # BLD: ABNORMAL 10*6/UL
RETIC %: 1.1 % (ref 0.5–1.9)
RETIC HEMOGLOBIN: 27.3 PG (ref 28.2–35.7)
SEG NEUTROPHILS: 90 % (ref 36–66)
SEGMENTED NEUTROPHILS ABSOLUTE COUNT: 24.93 K/UL (ref 1.8–7.7)
WBC # BLD: 27.7 K/UL (ref 3.5–11.3)
WBC # BLD: ABNORMAL 10*3/UL

## 2018-07-16 PROCEDURE — 99233 SBSQ HOSP IP/OBS HIGH 50: CPT | Performed by: INTERNAL MEDICINE

## 2018-07-16 PROCEDURE — 6360000002 HC RX W HCPCS: Performed by: INTERNAL MEDICINE

## 2018-07-16 PROCEDURE — 85045 AUTOMATED RETICULOCYTE COUNT: CPT

## 2018-07-16 PROCEDURE — 6360000002 HC RX W HCPCS: Performed by: NURSE PRACTITIONER

## 2018-07-16 PROCEDURE — 97116 GAIT TRAINING THERAPY: CPT

## 2018-07-16 PROCEDURE — 94618 PULMONARY STRESS TESTING: CPT

## 2018-07-16 PROCEDURE — 81270 JAK2 GENE: CPT

## 2018-07-16 PROCEDURE — 6370000000 HC RX 637 (ALT 250 FOR IP): Performed by: INTERNAL MEDICINE

## 2018-07-16 PROCEDURE — 94640 AIRWAY INHALATION TREATMENT: CPT

## 2018-07-16 PROCEDURE — 99221 1ST HOSP IP/OBS SF/LOW 40: CPT | Performed by: INTERNAL MEDICINE

## 2018-07-16 PROCEDURE — 2580000003 HC RX 258: Performed by: INTERNAL MEDICINE

## 2018-07-16 PROCEDURE — 94762 N-INVAS EAR/PLS OXIMTRY CONT: CPT

## 2018-07-16 PROCEDURE — 6370000000 HC RX 637 (ALT 250 FOR IP): Performed by: NURSE PRACTITIONER

## 2018-07-16 PROCEDURE — 97110 THERAPEUTIC EXERCISES: CPT

## 2018-07-16 PROCEDURE — 99232 SBSQ HOSP IP/OBS MODERATE 35: CPT | Performed by: INTERNAL MEDICINE

## 2018-07-16 PROCEDURE — 85025 COMPLETE CBC W/AUTO DIFF WBC: CPT

## 2018-07-16 PROCEDURE — 71046 X-RAY EXAM CHEST 2 VIEWS: CPT

## 2018-07-16 PROCEDURE — 2580000003 HC RX 258: Performed by: NURSE PRACTITIONER

## 2018-07-16 PROCEDURE — 36415 COLL VENOUS BLD VENIPUNCTURE: CPT

## 2018-07-16 PROCEDURE — 2060000000 HC ICU INTERMEDIATE R&B

## 2018-07-16 RX ORDER — LANOLIN ALCOHOL/MO/W.PET/CERES
325 CREAM (GRAM) TOPICAL 2 TIMES DAILY WITH MEALS
Status: DISCONTINUED | OUTPATIENT
Start: 2018-07-16 | End: 2018-07-19 | Stop reason: HOSPADM

## 2018-07-16 RX ADMIN — FORMOTEROL FUMARATE DIHYDRATE 20 MCG: 20 SOLUTION RESPIRATORY (INHALATION) at 20:42

## 2018-07-16 RX ADMIN — IPRATROPIUM BROMIDE AND ALBUTEROL SULFATE 1 AMPULE: .5; 3 SOLUTION RESPIRATORY (INHALATION) at 11:37

## 2018-07-16 RX ADMIN — ALBUTEROL SULFATE 2.5 MG: 2.5 SOLUTION RESPIRATORY (INHALATION) at 03:25

## 2018-07-16 RX ADMIN — IPRATROPIUM BROMIDE AND ALBUTEROL SULFATE 1 AMPULE: .5; 3 SOLUTION RESPIRATORY (INHALATION) at 20:35

## 2018-07-16 RX ADMIN — LURASIDONE HYDROCHLORIDE 40 MG: 40 TABLET, FILM COATED ORAL at 09:57

## 2018-07-16 RX ADMIN — SODIUM CHLORIDE 3 G: 900 INJECTION INTRAVENOUS at 20:58

## 2018-07-16 RX ADMIN — FAMOTIDINE 20 MG: 20 TABLET, FILM COATED ORAL at 09:57

## 2018-07-16 RX ADMIN — FORMOTEROL FUMARATE DIHYDRATE 20 MCG: 20 SOLUTION RESPIRATORY (INHALATION) at 08:14

## 2018-07-16 RX ADMIN — SODIUM CHLORIDE 3 G: 900 INJECTION INTRAVENOUS at 05:58

## 2018-07-16 RX ADMIN — FERROUS SULFATE TAB EC 325 MG (65 MG FE EQUIVALENT) 325 MG: 325 (65 FE) TABLET DELAYED RESPONSE at 20:58

## 2018-07-16 RX ADMIN — METHYLPREDNISOLONE SODIUM SUCCINATE 40 MG: 125 INJECTION, POWDER, FOR SOLUTION INTRAMUSCULAR; INTRAVENOUS at 03:38

## 2018-07-16 RX ADMIN — ESCITALOPRAM 20 MG: 10 TABLET, FILM COATED ORAL at 09:57

## 2018-07-16 RX ADMIN — IPRATROPIUM BROMIDE AND ALBUTEROL SULFATE 1 AMPULE: .5; 3 SOLUTION RESPIRATORY (INHALATION) at 08:14

## 2018-07-16 RX ADMIN — SODIUM CHLORIDE: 9 INJECTION, SOLUTION INTRAVENOUS at 03:39

## 2018-07-16 RX ADMIN — IPRATROPIUM BROMIDE AND ALBUTEROL SULFATE 1 AMPULE: .5; 3 SOLUTION RESPIRATORY (INHALATION) at 16:10

## 2018-07-16 RX ADMIN — FAMOTIDINE 20 MG: 20 TABLET, FILM COATED ORAL at 20:58

## 2018-07-16 RX ADMIN — ENOXAPARIN SODIUM 40 MG: 100 INJECTION SUBCUTANEOUS at 09:56

## 2018-07-16 RX ADMIN — SODIUM CHLORIDE 3 G: 900 INJECTION INTRAVENOUS at 14:15

## 2018-07-16 RX ADMIN — METHYLPREDNISOLONE SODIUM SUCCINATE 40 MG: 125 INJECTION, POWDER, FOR SOLUTION INTRAMUSCULAR; INTRAVENOUS at 09:57

## 2018-07-16 RX ADMIN — METHYLPREDNISOLONE SODIUM SUCCINATE 40 MG: 125 INJECTION, POWDER, FOR SOLUTION INTRAMUSCULAR; INTRAVENOUS at 18:07

## 2018-07-16 NOTE — PROGRESS NOTES
Smoking Cessation - topics covered   []  Health Risks  []  Benefits of Quitting   []  Smoking Cessation  []  Patient has no history of tobacco use  []  Patient is former smoker. []  No need for tobacco cessation education. []  Booklet given  [x]  Patient verbalizes understanding. [x]  Patient denies need for tobacco cessation education stating she has gone from smoking 3 packs a day down to 4 cigarettes a day.   Vlad Median  3:23 PM

## 2018-07-16 NOTE — CONSULTS
112 - 347 ug/dL   Ferritin   Result Value Ref Range    Ferritin 395 (H) 13 - 150 ug/L   CBC Auto Differential   Result Value Ref Range    WBC 27.7 (H) 3.5 - 11.3 k/uL    RBC 3.64 (L) 3.95 - 5.11 m/uL    Hemoglobin 10.3 (L) 11.9 - 15.1 g/dL    Hematocrit 33.0 (L) 36.3 - 47.1 %    MCV 90.7 82.6 - 102.9 fL    MCH 28.3 25.2 - 33.5 pg    MCHC 31.2 28.4 - 34.8 g/dL    RDW 16.7 (H) 11.8 - 14.4 %    Platelets 567 (H) 098 - 453 k/uL    MPV 8.9 8.1 - 13.5 fL    NRBC Automated 0.0 0.0 per 100 WBC    Differential Type NOT REPORTED     WBC Morphology NOT REPORTED     RBC Morphology NOT REPORTED     Platelet Estimate NOT REPORTED     Immature Granulocytes 3 (H) 0 %    Seg Neutrophils 90 (H) 36 - 66 %    Lymphocytes 6 (L) 24 - 44 %    Monocytes 1 1 - 7 %    Eosinophils % 0 (L) 1 - 4 %    Basophils 0 0 - 2 %    Absolute Immature Granulocyte 0.83 (H) 0.00 - 0.30 k/uL    Segs Absolute 24.93 (H) 1.8 - 7.7 k/uL    Absolute Lymph # 1.66 1.0 - 4.8 k/uL    Absolute Mono # 0.28 0.1 - 0.8 k/uL    Absolute Eos # 0.00 0.0 - 0.4 k/uL    Basophils # 0.00 0.0 - 0.2 k/uL    Morphology ANISOCYTOSIS PRESENT    Reticulocytes   Result Value Ref Range    Retic % 1.1 0.5 - 1.9 %    Absolute Retic # 0.040 0.030 - 0.080 M/uL    Immature Retic Fract 30.500 (H) 2.7 - 18.3 %    Retic Hemoglobin 27.3 (L) 28.2 - 35.7 pg       Xr Chest Standard (2 Vw)    Result Date: 7/16/2018  EXAMINATION: TWO VIEWS OF THE CHEST 7/16/2018 8:37 am COMPARISON: CT chest from outside institution on 7/14/2018. HISTORY: ORDERING SYSTEM PROVIDED HISTORY: Pneumonia TECHNOLOGIST PROVIDED HISTORY: Reason for exam:->Pneumonia FINDINGS: Cardiac and mediastinal contours are unchanged. Unchanged pleural thickening with cavitary findings, better seen on CT examination. This is likely secondary to differences between CT and radiographic technique. Patchy opacities within the left mid lung appear unchanged. Unchanged reticulonodular pulmonary opacities bilaterally.  No significant pleural follow. Discussed with patient and Nurse. Thank you for asking us to see this patient. Cole Christine MD          This note is created with the assistance of a speech recognition program.  While intending to generate a document that actually reflects the content of the visit, the document can still have some errors including those of syntax and sound a like substitutions which may escape proof reading. It such instances, actual meaning can be extrapolated by contextual diversion.

## 2018-07-16 NOTE — PLAN OF CARE
Problem: Falls - Risk of:  Goal: Will remain free from falls  Will remain free from falls   Outcome: Ongoing  Pt remains free of falls at this time. Bed locked in lowest position, siderails x2, call light in reach. Non-skid footwear applied. Pt ambulates in room with steady gait. Encouraged pt to call for assistance as needed for safety. Will continue to monitor.

## 2018-07-16 NOTE — PLAN OF CARE
Problem: Falls - Risk of:  Goal: Will remain free from falls  Will remain free from falls   Outcome: Met This Shift  Pt. Free of falls this shift. Bed locked in lowest position, side rails up, call light in reach, uses appropriately.

## 2018-07-16 NOTE — PROGRESS NOTES
Physical Therapy  Facility/Department: [address] STEPDOWN  Daily Treatment Note  NAME: Isabel Burnette  : 1970  MRN: [id number]    Date of Service: 2018    Discharge Recommendations:  Home with assist PRN        Patient Diagnosis(es): There were no encounter diagnoses. has a past medical history of Abnormal PFTs; Adenoma of left adrenal gland; Adrenal adenoma, right; Cavitary pneumonia; COPD (chronic obstructive pulmonary disease) ([facility] [address] [facility] Mass of left lung; and Smoker. has a past surgical history that includes Tubal ligation; Tonsillectomy; and Breast biopsy (). Restrictions  Restrictions/Precautions  Restrictions/Precautions: General Precautions, Fall Risk  Subjective   General  Chart Reviewed: Yes  Response To Previous Treatment: Patient with no complaints from previous session. Family / Caregiver Present: No  Subjective  Subjective: Pt and RN agreeable to PT. Pt supine in bed, reports \"I've had a busy morning\". Pleasant and cooperative with treatment  General Comment  Comments: Pt left in bed with call light within reach  Pain Screening  Patient Currently in Pain: Denies  Vital Signs  Patient Currently in Pain: Denies       Orientation  Orientation  Overall Orientation Status: Within Normal Limits  Objective   Bed mobility  Rolling to Left: Supervision  Rolling to Right: Supervision  Supine to Sit: Supervision  Sit to Supine: Supervision  Scooting: Supervision  Transfers  Sit to Stand: Stand by assistance  Stand to sit: Stand by assistance  Comment: good safety awareness  Ambulation  Ambulation?: Yes  Ambulation 1  Surface: level tile  Device: No Device;Rolling Walker  Other Apparatus:  (IV pole)  Assistance: Contact guard assistance  Quality of Gait: slow pace otherwise normal gait  Distance: pt amb ~25 with no AD, requested use of RW d/t fatigue. Amb additional 26' with RW  Comments: pt amb on RA, SpO2 94% after amb.  Pt reports B LE fatigue after amb, no LOB noted  Stairs/Curb  Stairs?: No Concurrent Group Co-treatment   Time In 1024         Time Out 1047         Minutes [address] [place], [place]

## 2018-07-16 NOTE — PROGRESS NOTES
weakness tightness fatigue and increased sleepiness not feeling well along with shortness of breath with greenish sputum production with cough and also had few days of hemoptysis and had chest pain also, on admission her white cell count was elevated and is still 27,000 but some of them now is contributed by Solu-Medrol, she has bilateral expiratory wheezing and she has rhonchi and crackles in the left lower mid lung. CT scan of the chest shows areas of fibrotic scarring and cavitation on the left side in upper lung and also bronchiectatic and fibrotic changes in left lower lung field she also has bronchiectasis present on the right side CT scan is not much change as compared to CT scan in January this year and most likely have an infection and bronchiectasis exacerbation with COPD/emphysema exacerbation. Clinically she is doing better her appetite is better she claims that she has no hemoptysis, no chest pain and her shortness of breath is improved since admission so she is responding to Unasyn sputum culture so far is negative and need follow-up final results. Continue with IV antibiotics at this time and she will need longer course of antibiotic. Although final culture results. Follow up clinically and WBC count. May change IV steroids to oral prednisone tomorrow. Continue with bronchodilators therapy.     Discussed with Dr. Jey Johnson MD  7/16/2018 5:21 PM

## 2018-07-16 NOTE — PLAN OF CARE
Problem: RESPIRATORY  Intervention: Respiratory assessment  [facility]. COPD (chronic obstructive pulmonary disease) ([facility] [J44.9] . Vitals:    07/16/18 0330   BP: 106/67   Pulse: 88   Resp: 22   Temp: 97.5 °F (36.4 °C)   SpO2: 100%   . Patients home meds are   Prior to Admission medications    Medication Sig Start Date End Date Taking?  Authorizing Provider   lurasidone (LATUDA) 40 MG TABS tablet Take 40 mg by mouth daily   Yes Historical Provider, MD   escitalopram (LEXAPRO) 20 MG tablet Take 20 mg by mouth daily   Yes Historical Provider, MD   albuterol (PROVENTIL) (2.5 MG/3ML) 0.083% nebulizer solution Take 3 mLs by nebulization 4 times daily 5/9/18 6/8/18 Gaylene Delay, MD   budesonide (PULMICORT) 0.5 MG/2ML nebulizer suspension Take 2 mLs by nebulization 2 times daily 5/9/18 6/8/18 Gaylene Delay, MD   ipratropium-albuterol (DUONEB) 0.5-2.5 (3) MG/3ML SOLN nebulizer solution Inhale 3 mLs into the lungs every 4 hours 5/9/18 6/8/18 Gaylene Delay, MD   Arformoterol Tartrate (BROVANA) 15 MCG/2ML NEBU Take 1 ampule by nebulization 2 times daily    Historical Provider, MD   traZODone (DESYREL) 50 MG tablet Take 150 mg by mouth nightly     Historical Provider, MD . Marylene Laura for Pneumonia exacerbation copd        History of Cavitary Lesions requiring lobectomy               RR 26    Breath Sounds: wheeze      · Bronchodilator assessment at level  4  · Hyperinflation assessment at level   · Secretion Management assessment at level    ·   · [x]    Bronchodilator Assessment  BRONCHODILATOR ASSESSMENT SCORE  Score 0 1 2 3 4 5   Breath Sounds   []  Patient Baseline []  No Wheeze good aeration []  Faint, scattered wheezing, good aeration []  Expiratory Wheezing and or moderately diminished [x]  Insp/Exp wheeze and/or very diminished []  Insp/Exp and/ or marked distress   Respiratory Rate   []  Patient Baseline []  Less than 20 []  Less than 20 []  20-25 [x]  Greater than 25 []  Greater than 25   Peak flow % of Pred or PB []  NA   []  Greater than 90%  []  81-90% []  71-80% []  Less than or equal to 70%  or unable to perform []  Unable due to Respiratory Distress   Dyspnea re []  Patient Baseline []  No SOB []  No SOB []  SOB on exertion [x]  SOB min activity []  At rest/acute   e FEV% Predicted       [x]  NA []  Above 69%  []  Unable []  Above 60-69%  []  Unable []  Above 50-59%  []  Unable []  Above 35-49%  []  Unable []  Less than 35%  []  Unable                 []  Hyperinflation Assessment  Score 1 2 3   CXR and Breath Sounds   []  Clear []  No atelectasis  Basilar aeration []  Atelectasis or absent basilar breath sounds   Incentive Spirometry Volume  (Per IBW)   []  Greater than or equal to 15ml/Kg []  less than 15ml/Kg []  less than 15ml/Kg   Surgery within last 2 weeks []  None or general   []  Abdominal or thoracic surgery  []  Abdominal or thoracic   Chronic Pulmonary Historyre []  No []  Yes []  Yes     []  Secretion Management Assessment  Score 1 2 3   Bilateral Breath Sounds   []  Occasional Rhonchi []  Scattered Rhonchi []  Course Rhonchi and/or poor aeration   Sputum    []  Small amount of thin secretions []  Moderate amount of viscous secretions []  Copius, Viscious Yellow/ Secretions   CXR as reported by physician []  clear  []  Unavailable []  Infiltrates and/or consolidation  []  Unavailable []  Mucus Plugging and or lobar consolidation  []  Unavailable   Cough []  Strong, productive cough []  Weak productive cough []  No cough or weak non-productive cough   BERENICE VIZCAINO JOANA  8:16 AM                            FEMALE                                  MALE                            FEV1 Predicted Normal Values                        FEV1 Predicted Normal Values          Age                                     Height in Feet and Inches       Age                                     Height in Feet and Inches       4' 11\" 5' 1\" 5' 3\" 5' 5\" 5' 7\" 5' 9\" 5' 11\" 6' 1\"  4' 11\" 390 419 448 [phone] 562             Intervention: Administer treatments as ordered  BRONCHOSPASM/BRONCHOCONSTRICTION     [x]         IMPROVE AERATION/BREATH SOUNDS  [x]   ADMINISTER BRONCHODILATOR THERAPY AS APPROPRIATE  [x]   ASSESS BREATH SOUNDS  [x]   IMPLEMENT AEROSOL/MDI PROTOCOL  [x]   PATIENT EDUCATION AS NEEDED      Intervention: Education, Medication and treatments    AIDET method used to introduce myself to patient and or parent in room    Patient and or Parent educated on current respiratory medication and modalities   administered. Possible side effects of medications discussed. Questions answered. Patient and or Parent accepts Daily POC and treatment plan.

## 2018-07-16 NOTE — CARE COORDINATION
Qualifies for home O2 with exercise. Plan is to discharge to home. Need face to face and order from attending. Plan is to return to home with family.

## 2018-07-17 LAB
CULTURE: ABNORMAL
DIRECT EXAM: ABNORMAL
Lab: ABNORMAL
SPECIMEN DESCRIPTION: ABNORMAL
STATUS: ABNORMAL
SURGICAL PATHOLOGY REPORT: NORMAL

## 2018-07-17 PROCEDURE — 6360000002 HC RX W HCPCS: Performed by: INTERNAL MEDICINE

## 2018-07-17 PROCEDURE — 99232 SBSQ HOSP IP/OBS MODERATE 35: CPT | Performed by: INTERNAL MEDICINE

## 2018-07-17 PROCEDURE — 6370000000 HC RX 637 (ALT 250 FOR IP): Performed by: INTERNAL MEDICINE

## 2018-07-17 PROCEDURE — 6360000002 HC RX W HCPCS: Performed by: NURSE PRACTITIONER

## 2018-07-17 PROCEDURE — 97116 GAIT TRAINING THERAPY: CPT

## 2018-07-17 PROCEDURE — 2580000003 HC RX 258: Performed by: NURSE PRACTITIONER

## 2018-07-17 PROCEDURE — 99233 SBSQ HOSP IP/OBS HIGH 50: CPT | Performed by: INTERNAL MEDICINE

## 2018-07-17 PROCEDURE — 2060000000 HC ICU INTERMEDIATE R&B

## 2018-07-17 PROCEDURE — 94762 N-INVAS EAR/PLS OXIMTRY CONT: CPT

## 2018-07-17 PROCEDURE — 2580000003 HC RX 258: Performed by: INTERNAL MEDICINE

## 2018-07-17 PROCEDURE — 6370000000 HC RX 637 (ALT 250 FOR IP): Performed by: NURSE PRACTITIONER

## 2018-07-17 PROCEDURE — 97110 THERAPEUTIC EXERCISES: CPT

## 2018-07-17 PROCEDURE — 94640 AIRWAY INHALATION TREATMENT: CPT

## 2018-07-17 RX ADMIN — IPRATROPIUM BROMIDE AND ALBUTEROL SULFATE 1 AMPULE: .5; 3 SOLUTION RESPIRATORY (INHALATION) at 15:51

## 2018-07-17 RX ADMIN — LURASIDONE HYDROCHLORIDE 40 MG: 40 TABLET, FILM COATED ORAL at 08:52

## 2018-07-17 RX ADMIN — FORMOTEROL FUMARATE DIHYDRATE 20 MCG: 20 SOLUTION RESPIRATORY (INHALATION) at 09:37

## 2018-07-17 RX ADMIN — ALBUTEROL SULFATE 2.5 MG: 2.5 SOLUTION RESPIRATORY (INHALATION) at 03:51

## 2018-07-17 RX ADMIN — ENOXAPARIN SODIUM 40 MG: 100 INJECTION SUBCUTANEOUS at 08:52

## 2018-07-17 RX ADMIN — METHYLPREDNISOLONE SODIUM SUCCINATE 40 MG: 125 INJECTION, POWDER, FOR SOLUTION INTRAMUSCULAR; INTRAVENOUS at 17:56

## 2018-07-17 RX ADMIN — SODIUM CHLORIDE 3 G: 900 INJECTION INTRAVENOUS at 06:35

## 2018-07-17 RX ADMIN — ESCITALOPRAM 20 MG: 10 TABLET, FILM COATED ORAL at 08:51

## 2018-07-17 RX ADMIN — FERROUS SULFATE TAB EC 325 MG (65 MG FE EQUIVALENT) 325 MG: 325 (65 FE) TABLET DELAYED RESPONSE at 08:52

## 2018-07-17 RX ADMIN — IPRATROPIUM BROMIDE AND ALBUTEROL SULFATE 1 AMPULE: .5; 3 SOLUTION RESPIRATORY (INHALATION) at 09:37

## 2018-07-17 RX ADMIN — METHYLPREDNISOLONE SODIUM SUCCINATE 40 MG: 125 INJECTION, POWDER, FOR SOLUTION INTRAMUSCULAR; INTRAVENOUS at 10:21

## 2018-07-17 RX ADMIN — SODIUM CHLORIDE 3 G: 900 INJECTION INTRAVENOUS at 13:51

## 2018-07-17 RX ADMIN — FAMOTIDINE 20 MG: 20 TABLET, FILM COATED ORAL at 20:58

## 2018-07-17 RX ADMIN — FAMOTIDINE 20 MG: 20 TABLET, FILM COATED ORAL at 08:51

## 2018-07-17 RX ADMIN — SODIUM CHLORIDE 3 G: 900 INJECTION INTRAVENOUS at 20:58

## 2018-07-17 RX ADMIN — IPRATROPIUM BROMIDE AND ALBUTEROL SULFATE 1 AMPULE: .5; 3 SOLUTION RESPIRATORY (INHALATION) at 20:31

## 2018-07-17 RX ADMIN — METHYLPREDNISOLONE SODIUM SUCCINATE 40 MG: 125 INJECTION, POWDER, FOR SOLUTION INTRAMUSCULAR; INTRAVENOUS at 04:06

## 2018-07-17 RX ADMIN — FERROUS SULFATE TAB EC 325 MG (65 MG FE EQUIVALENT) 325 MG: 325 (65 FE) TABLET DELAYED RESPONSE at 17:56

## 2018-07-17 RX ADMIN — IPRATROPIUM BROMIDE AND ALBUTEROL SULFATE 1 AMPULE: .5; 3 SOLUTION RESPIRATORY (INHALATION) at 11:49

## 2018-07-17 RX ADMIN — ALBUTEROL SULFATE 2.5 MG: 2.5 SOLUTION RESPIRATORY (INHALATION) at 00:11

## 2018-07-17 RX ADMIN — SODIUM CHLORIDE: 9 INJECTION, SOLUTION INTRAVENOUS at 08:51

## 2018-07-17 RX ADMIN — SODIUM CHLORIDE: 9 INJECTION, SOLUTION INTRAVENOUS at 23:20

## 2018-07-17 ASSESSMENT — PAIN SCALES - GENERAL: PAINLEVEL_OUTOF10: 0

## 2018-07-17 NOTE — PLAN OF CARE
Problem: Falls - Risk of:  Goal: Will remain free from falls  Will remain free from falls   Outcome: Ongoing  Pt remains free of falls at this time. Bed locked in lowest position, siderails x2, call light in reach. Non-skid footwear applied. Pt ambulates in room with steady gait. Encouraged pt to call for assistance as needed for safety.

## 2018-07-17 NOTE — PROGRESS NOTES
[facility]    Progress Note    7/17/2018    10:12 AM    Name:   Daniel Bellamy  MRN:     [id number]     Acct:      [de-identified]   Room:   [address] [place] [address] Day:  2  Admit Date:  7/15/2018  2:28 AM    PCP:   Hima Esparza PA-C  Code Status:  Full Code    Subjective:     C/C: Shortness of breath    Interval History Status: improved. Continued improvement in SOB per patient  No worsened coughing  Able to walk on floor with out severe SOB  No fevers or chills reported    Brief History:     Miss Elly Gamboa is a nice 46year old lady with history of extensive tobacco abuse, stage IV COPD, history of MSSA pneumonia with cavitary lesions in left lung and bronchiectasis. She was admitted to [facility] ER with complaint of fatigue and tiredness, sleeping most of the time since return to [place] from [place]. Complains of much worsened coughing from baseline all last week and when finally she got here, tired and fatigued. Denies any SOB. Work up with CT[facility] chest in [facility] showed possible pneumonitis along with extensive chronic changes in left lung    Review of Systems:     Constitutional:  negative for chills, fevers, sweats  Respiratory: continued coughing and SOB with out much change  Cardiovascular:  negative for chest pain, chest pressure/discomfort  Gastrointestinal:  negative for abdominal pain, constipation, diarrhea, nausea  Neurological:  negative for dizziness, headache    Medications: Allergies:     Allergies   Allergen Reactions    Aspirin Anaphylaxis and Hives     Hives, lips swell    Benadryl [Diphenhydramine] Anaphylaxis       Current Meds:   Scheduled Meds:    ferrous sulfate  325 mg Oral BID WC    formoterol  20 mcg Nebulization BID    escitalopram  20 mg Oral Daily    famotidine  20 mg Oral BID    enoxaparin  40 mg Subcutaneous Daily    methylPREDNISolone  40 mg Intravenous Q8H    ampicillin-sulbactam  3 g Intravenous splenomegaly  Extremities:  no edema, redness, tenderness in the calves  Skin:  no gross lesions, rashes, induration    Assessment:        Primary Problem  Bronchiectasis with acute lower respiratory infection [facility])    Active Hospital Problems    Diagnosis Date Noted    Lung infiltrate on CT [R91.8] 07/15/2018    Hx of bronchiectasis [Z87.09] 07/15/2018    History of adrenal adenoma [Z86.2] 07/15/2018    Thrombocytosis ([address] [D47.3] 07/15/2018    Bronchiectasis with acute lower respiratory infection ([address] [J47.0] 07/15/2018    COPD exacerbation ([address] [J44.1] 07/14/2018     Plan:        Principal Problem:    Bronchiectasis with acute lower respiratory infection ([address]): On Unasyn now. Sputum culture with mixed bacterial morphocytes. Still has some wheezing. Possible IV antibiotics for next 48 hours. Await pulmonary evaluation today    COPD exacerbation ([address]): Solumedrol and inhalers continued, wheezing continued today     Mild protein calorie malnutrition    Lung infiltrate on CT    Hx of bronchiectasis    History of adrenal adenoma    Thrombocytosis ([address] Cezar 2 mutation, repeat CBC today.  Oncology evaluation            Jaya Ramon MD  7/17/2018  10:12 AM

## 2018-07-17 NOTE — PROGRESS NOTES
Attempted to retrieve [facility] records [facility] per Dr. Kaley Lopez order. The office is closed and will re-open tomorrow AM, so we will need to re-attempt tomorrow morning.   Phone number is [phone]    Electronically signed by Eusebio Jang RN on 7/17/2018 at 2:45 PM

## 2018-07-17 NOTE — PROGRESS NOTES
PULMONARY PROGRESS NOTE      Patient:  Trae Chirinos  YOB: 1970    MRN: [id number]     Acct: [de-identified]     Admit date: 7/15/2018    REASON FOR CONSULT:-  Productive cough and shortness of breath    Pt seen and Chart reviewed. Patient's shortness of breath has improved. Patient remained afebrile since admission. Patient denies any nausea vomiting chest pain or productive cough at this time.     Subjective:   Review of Systems -   General ROS: Completed and except as mentioned above were negative   Respiratory ROS:  Completed and except as mentioned above were negative  Cardiovascular ROS:  Completed and except as mentioned above were negative  Gastrointestinal ROS: Completed and except as mentioned above were negative  Genito-Urinary ROS:  Completed and except as mentioned above were negative  Musculoskeletal ROS:  Completed and except as mentioned above were negative  Neurological ROS:  Completed and except as mentioned above were negative  Dermatological ROS:  Completed and except as mentioned above were negative      Diet:  DIET GENERAL;  Dietary Nutrition Supplements: Standard High Calorie Oral Supplement      Medications:Current Inpatient    Scheduled Meds:   ferrous sulfate  325 mg Oral BID WC    formoterol  20 mcg Nebulization BID    escitalopram  20 mg Oral Daily    famotidine  20 mg Oral BID    enoxaparin  40 mg Subcutaneous Daily    methylPREDNISolone  40 mg Intravenous Q8H    ampicillin-sulbactam  3 g Intravenous Q8H    ipratropium-albuterol  1 ampule Inhalation 4x daily    albuterol  2.5 mg Nebulization BID    lurasidone  40 mg Oral Daily     Continuous Infusions:   sodium chloride 75 mL/hr at 07/17/18 0851     PRN Meds:ondansetron, nicotine, acetaminophen, albuterol    Objective:      Physical Exam:  Vitals: BP (!) 111/56   Pulse 91   Temp 98 °F (36.7 °C) (Oral)   Resp 25   Ht 5' 5\" (1.651 m)   Wt 114 lb (51.7 kg)   SpO2 92%   BMI 18.97 kg/m²   24 hour intake/output:  Intake/Output Summary (Last 24 hours) at 07/17/18 1453  Last data filed at 07/17/18 9969   Gross per 24 hour   Intake             3502 ml   Output             2800 ml   Net              702 ml     Last 3 weights: Wt Readings from Last 3 Encounters:   07/16/18 114 lb (51.7 kg)   02/19/18 121 lb (54.9 kg)   02/19/18 121 lb (54.9 kg)         Physical Examination:   PHYSICAL EXAMINATION:  Vitals:    07/17/18 0000 07/17/18 0400 07/17/18 0710 07/17/18 1219   BP: 120/77 117/80 (!) 113/58 (!) 111/56   Pulse: 91 77 93 91   Resp: 22 21 22 25   Temp: 97.5 °F (36.4 °C) 97.6 °F (36.4 °C) 97.5 °F (36.4 °C) 98 °F (36.7 °C)   TempSrc: Oral Oral Oral Oral   SpO2: 99% 100% 93% 92%   Weight:       Height:         Constitutional: This is a well developed, well nourished,  Respiratory: Chest was symmetrical.  Increased breath sounds on left lower lung with dullness to percussion. There is no intercostal retraction or use of accessory muscles. Cardiovascular: Regular without murmur, clicks, gallops or rubs. Abdomen: Slightly rounded and soft without organomegaly. No rebound tenderness, rigidity or guarding was appreciated. Musculoskeletal: Normal curvature of the spine. No gross muscle weakness. Extremities:  No lower extremity edema, ulcerations, tenderness, varicosities or erythema. Muscle size, tone and strength are normal.  No involuntary movements are noted. Skin:  Warm and dry. Good color, turgor and pigmentation. No lesions or scars.   No cyanosis or clubbing  Neurological/Psychiatric: The patient's general behavior, level of consciousness, thought content and emotional status is normal.          CBC:   Recent Labs      07/15/18   1227  07/16/18   1121   WBC  23.7*  27.7*   HGB  10.9*  10.3*   PLT  829*  807*     BMP:  Recent Labs      07/15/18   1227   NA  133*   K  4.8   CL  94*   CO2  27   BUN  15   CREATININE  0.34*   GLUCOSE  210* permitted and tolerated. Questions patient/family had were answered to their satisfaction. Continue supplemental oxygen      Thank you for having us involved in the care of your patient. Please call us if you have any questions or concerns.     Naina Martinez MD      7/17/2018, 2:53 PM    Pulmonary & Critical Care

## 2018-07-17 NOTE — PROGRESS NOTES
organomegaly  Neurological - alert, oriented, normal speech, no focal findings or movement disorder noted  Extremities - peripheral pulses normal, no pedal edema, no clubbing or cyanosis  Skin - normal coloration and turgor, no rashes, no suspicious skin lesions noted         Data:    I/O this shift: In: 900 [P.O.:900]  Out: 2100 [Urine:2100]  In: 9771 [P.O.:2400; I.V.:1102]  Out: 2800 [Urine:2800]    CBC:   Recent Labs      07/15/18   1227  07/16/18   1121   WBC  23.7*  27.7*   HGB  10.9*  10.3*   PLT  829*  807*     BMP:    Recent Labs      07/15/18   1227   NA  133*   K  4.8   CL  94*   CO2  27   BUN  15   CREATININE  0.34*   GLUCOSE  210*     Hepatic: No results for input(s): AST, ALT, ALB, BILITOT, ALKPHOS in the last 72 hours. INR: No results for input(s): INR in the last 72 hours. PTT:No results for input(s): PTT in the last 72 hours. Results for orders placed or performed during the hospital encounter of 07/15/18   Sputum gram stain   Result Value Ref Range    Specimen Description . EXPECTORATED SPUTUM     Special Requests NOT REPORTED     Direct Exam DUPLICATE ORDER     Direct Exam       GRAM STAINS SHOULD NEVER BE ORDERED ON RESPIRATORY CULTURES AS THEY ARE ALWAYS    Direct Exam  INCLUDED     Status FINAL 07/15/2018    Respiratory Culture   Result Value Ref Range    Specimen Description . EXPECTORATED SPUTUM     Special Requests NOT REPORTED     Direct Exam < 10 EPITHELIAL CELLS/LPF     Direct Exam >25 NEUTROPHILS/LPF     Direct Exam (A)      VERY SCANT MIXED BACTERIAL MORPHOTYPES SEEN ON GRAM STAIN. Culture NORMAL RESPIRATORY EB MODERATE GROWTH     Status FINAL 07/17/2018    Culture Blood #1   Result Value Ref Range    Specimen Description . BLOOD     Special Requests lt forearm     Culture NO GROWTH 2 DAYS     Status Pending    Basic Metabolic Panel w/ Reflex to MG   Result Value Ref Range    Glucose 210 (H) 70 - 99 mg/dL    BUN 15 6 - 20 mg/dL    CREATININE 0.34 (L) 0.50 - 0.90 mg/dL    Bun/Cre 453 k/uL    MPV 8.9 8.1 - 13.5 fL    NRBC Automated 0.0 0.0 per 100 WBC    Differential Type NOT REPORTED     WBC Morphology NOT REPORTED     RBC Morphology NOT REPORTED     Platelet Estimate NOT REPORTED     Immature Granulocytes 3 (H) 0 %    Seg Neutrophils 90 (H) 36 - 66 %    Lymphocytes 6 (L) 24 - 44 %    Monocytes 1 1 - 7 %    Eosinophils % 0 (L) 1 - 4 %    Basophils 0 0 - 2 %    Absolute Immature Granulocyte 0.83 (H) 0.00 - 0.30 k/uL    Segs Absolute 24.93 (H) 1.8 - 7.7 k/uL    Absolute Lymph # 1.66 1.0 - 4.8 k/uL    Absolute Mono # 0.28 0.1 - 0.8 k/uL    Absolute Eos # 0.00 0.0 - 0.4 k/uL    Basophils # 0.00 0.0 - 0.2 k/uL    Morphology ANISOCYTOSIS PRESENT    Reticulocytes   Result Value Ref Range    Retic % 1.1 0.5 - 1.9 %    Absolute Retic # 0.040 0.030 - 0.080 M/uL    Immature Retic Fract 30.500 (H) 2.7 - 18.3 %    Retic Hemoglobin 27.3 (L) 28.2 - 35.7 pg   Surgical Pathology   Result Value Ref Range    Surgical Pathology Report       (NOTE)  [id number]  [address] [place] [address]. [place], [address]  [phone]  Fax: [phone]  [address] [place] [address]    Patient Name: Isaiah Jenkins  MR#: [id number]  Specimen #[id number]    Procedures/Addenda  PERIPHERAL BLOOD REPORT     Date Ordered:     7/17/2018     Status:  Signed Out      Date Complete:     7/17/2018     By: Marci Davidson M.D. Date Reported:     7/17/2018       INTERPRETATION  PERIPHERAL BLOOD: NEUTROPHILIA, THROMBOCYTOSIS AND MILD NORMOCYTIC  ANEMIA, FAVORING HYPOPROLIFERATIVE ANEMIA. THROMBOCYTOSIS PERSISTENT  FROM 1/2018. REQUEST FOR JAK2 MUTATION IS NOTED AND PATIENT BEING  SEEN CONSULTATION BY HEMATOLOGY/ ONCOLOGY.       RESULTS-COMMENTS                          PERIPHERAL BLOOD STUDY    HEMOGRAM                              DIFFERENTIAL %     ABSOLUTE  (K/UL)    WBC (K/uL)     27.7               IMM GRANS          3          0.83       RBC (K/uL) 3.64                SEGS               90          24.93  HGB (G/dL)     10.3               LYMPHS          6          1.66  HCT (%)     33.0                                     MCV (FL.)     90.7               MONOS          1          0.28  MCH (PG.)     28.3                                          MCHC (g/dL)     31.2                                          RDW (%)     16.7                                          PLT (k/uL)     807                                          RETIC (%)     1.1                                     Absolute RetCt     0.040                                                                                                                       PERIPHERAL EXAMINATION BY PATHOLOGIST    PLATELETS: Normal          LEUKOCYTES: Minimal neutrophilic left shift      ERYTHROCYTES: Anisocytosis, otherwise normal    Star Dominguez M.D. Source:  1: PERIPHERAL BLOOD FOR REVIEW BY PATHOLOGIST         Xr Chest Standard (2 Vw)    Result Date: 7/16/2018  EXAMINATION: TWO VIEWS OF THE CHEST 7/16/2018 8:37 am COMPARISON: CT chest from outside institution on 7/14/2018. HISTORY: ORDERING SYSTEM PROVIDED HISTORY: Pneumonia TECHNOLOGIST PROVIDED HISTORY: Reason for exam:->Pneumonia FINDINGS: Cardiac and mediastinal contours are unchanged. Unchanged pleural thickening with cavitary findings, better seen on CT examination. This is likely secondary to differences between CT and radiographic technique. Patchy opacities within the left mid lung appear unchanged. Unchanged reticulonodular pulmonary opacities bilaterally. No significant pleural effusion. No evidence of pneumothorax. No acute osseous abnormalities. 1. No significant interval change since 7/14/2018. Unchanged cavitary findings within the left lung, with patchy left midlung pulmonary opacities. Recommend radiographic follow-up to complete resolution. 2. No evidence of pneumothorax or significant pleural effusion. with the assistance of a speech recognition program.  While intending to generate a document that actually reflects the content of the visit, the document can still have some errors including those of syntax and sound a like substitutions which may escape proof reading. It such instances, actual meaning can be extrapolated by contextual diversion.

## 2018-07-17 NOTE — PROGRESS NOTES
Dynamic: Good  Standing - Static: Good  Standing - Dynamic: Good;-  Comments: standing balance assessed with no AD  Exercises  Comments: reviewed HEP for standing B LE exs, completed 10x  with 1 UE supported on bedrail. Assessment   Body structures, Functions, Activity limitations: Decreased functional mobility ; Decreased endurance;Decreased strength  Assessment: Pt is 47 y/o female with difficulty walking due to SOB and fatigue; will benefit from PT. Prognosis: Good  Patient Education: importance of mobility, use of IS and acapella  REQUIRES PT FOLLOW UP: Yes  Activity Tolerance  Activity Tolerance: Patient Tolerated treatment well     Goals  Short term goals  Time Frame for Short term goals: 14 visits  Short term goal 1: Pt to ambulate 300ft without AD and no LOB. Short term goal 2: Pt to demonstrate good dynamic standing balance. Short term goal 3: Pt to tolerate 20-30 mins ther ex/act for improved strength and endurance. Short term goal 4: Pt to ascend/descend one flight of stairs to prepare for safe return home. Patient Goals   Patient goals : Home following discharge    Plan    Plan  Times per week: 5 x week  Times per day: Daily  Current Treatment Recommendations: Strengthening, Balance Training, Functional Mobility Training, Transfer Training, Stair training, Gait Training, Endurance Training, Safety Education & Training, Home Exercise Program, Neuromuscular Re-education  Safety Devices  Type of devices:  All fall risk precautions in place, Call light within reach, Gait belt, Nurse notified, Left in chair  Restraints  Initially in place: No     Therapy Time   Individual Concurrent Group Co-treatment   Time In 1325         Time Out 1350         Minutes [address] [place] [address]

## 2018-07-18 LAB
ABSOLUTE EOS #: 0 K/UL (ref 0–0.4)
ABSOLUTE IMMATURE GRANULOCYTE: 5.81 K/UL (ref 0–0.3)
ABSOLUTE LYMPH #: 4.52 K/UL (ref 1–4.8)
ABSOLUTE MONO #: 0.65 K/UL (ref 0.1–0.8)
BASOPHILS # BLD: 0 % (ref 0–2)
BASOPHILS ABSOLUTE: 0 K/UL (ref 0–0.2)
DIFFERENTIAL TYPE: ABNORMAL
EOSINOPHILS RELATIVE PERCENT: 0 % (ref 1–4)
HCT VFR BLD CALC: 35.6 % (ref 36.3–47.1)
HEMOGLOBIN: 11.4 G/DL (ref 11.9–15.1)
IMMATURE GRANULOCYTES: 18 %
LYMPHOCYTES # BLD: 14 % (ref 24–44)
MCH RBC QN AUTO: 28 PG (ref 25.2–33.5)
MCHC RBC AUTO-ENTMCNC: 32 G/DL (ref 28.4–34.8)
MCV RBC AUTO: 87.5 FL (ref 82.6–102.9)
MONOCYTES # BLD: 2 % (ref 1–7)
MORPHOLOGY: ABNORMAL
NRBC AUTOMATED: 0.1 PER 100 WBC
PATHOLOGIST REVIEW: NORMAL
PDW BLD-RTO: 16.9 % (ref 11.8–14.4)
PLATELET # BLD: 932 K/UL (ref 138–453)
PLATELET ESTIMATE: ABNORMAL
PMV BLD AUTO: 8.6 FL (ref 8.1–13.5)
RBC # BLD: 4.07 M/UL (ref 3.95–5.11)
RBC # BLD: ABNORMAL 10*6/UL
SEG NEUTROPHILS: 66 % (ref 36–66)
SEGMENTED NEUTROPHILS ABSOLUTE COUNT: 21.32 K/UL (ref 1.8–7.7)
V617F MUTATION, QNT: 0 %
WBC # BLD: 32.3 K/UL (ref 3.5–11.3)
WBC # BLD: ABNORMAL 10*3/UL

## 2018-07-18 PROCEDURE — 6360000002 HC RX W HCPCS: Performed by: INTERNAL MEDICINE

## 2018-07-18 PROCEDURE — 94640 AIRWAY INHALATION TREATMENT: CPT

## 2018-07-18 PROCEDURE — 6370000000 HC RX 637 (ALT 250 FOR IP): Performed by: INTERNAL MEDICINE

## 2018-07-18 PROCEDURE — 2580000003 HC RX 258: Performed by: INTERNAL MEDICINE

## 2018-07-18 PROCEDURE — 2060000000 HC ICU INTERMEDIATE R&B

## 2018-07-18 PROCEDURE — 36415 COLL VENOUS BLD VENIPUNCTURE: CPT

## 2018-07-18 PROCEDURE — 6360000002 HC RX W HCPCS: Performed by: NURSE PRACTITIONER

## 2018-07-18 PROCEDURE — 6370000000 HC RX 637 (ALT 250 FOR IP): Performed by: NURSE PRACTITIONER

## 2018-07-18 PROCEDURE — 99232 SBSQ HOSP IP/OBS MODERATE 35: CPT | Performed by: INTERNAL MEDICINE

## 2018-07-18 PROCEDURE — 94762 N-INVAS EAR/PLS OXIMTRY CONT: CPT

## 2018-07-18 PROCEDURE — 85025 COMPLETE CBC W/AUTO DIFF WBC: CPT

## 2018-07-18 PROCEDURE — 6370000000 HC RX 637 (ALT 250 FOR IP): Performed by: STUDENT IN AN ORGANIZED HEALTH CARE EDUCATION/TRAINING PROGRAM

## 2018-07-18 RX ORDER — AMOXICILLIN AND CLAVULANATE POTASSIUM 875; 125 MG/1; MG/1
1 TABLET, FILM COATED ORAL EVERY 12 HOURS SCHEDULED
Status: DISCONTINUED | OUTPATIENT
Start: 2018-07-18 | End: 2018-07-19 | Stop reason: HOSPADM

## 2018-07-18 RX ORDER — ALBUTEROL SULFATE 2.5 MG/3ML
2.5 SOLUTION RESPIRATORY (INHALATION) 2 TIMES DAILY
Status: DISCONTINUED | OUTPATIENT
Start: 2018-07-18 | End: 2018-07-19 | Stop reason: HOSPADM

## 2018-07-18 RX ADMIN — IPRATROPIUM BROMIDE AND ALBUTEROL SULFATE 1 AMPULE: .5; 3 SOLUTION RESPIRATORY (INHALATION) at 15:33

## 2018-07-18 RX ADMIN — FERROUS SULFATE TAB EC 325 MG (65 MG FE EQUIVALENT) 325 MG: 325 (65 FE) TABLET DELAYED RESPONSE at 18:14

## 2018-07-18 RX ADMIN — METHYLPREDNISOLONE SODIUM SUCCINATE 40 MG: 125 INJECTION, POWDER, FOR SOLUTION INTRAMUSCULAR; INTRAVENOUS at 18:14

## 2018-07-18 RX ADMIN — FORMOTEROL FUMARATE DIHYDRATE 20 MCG: 20 SOLUTION RESPIRATORY (INHALATION) at 19:56

## 2018-07-18 RX ADMIN — IPRATROPIUM BROMIDE AND ALBUTEROL SULFATE 1 AMPULE: .5; 3 SOLUTION RESPIRATORY (INHALATION) at 07:30

## 2018-07-18 RX ADMIN — ALBUTEROL SULFATE 2.5 MG: 2.5 SOLUTION RESPIRATORY (INHALATION) at 04:04

## 2018-07-18 RX ADMIN — ESCITALOPRAM 20 MG: 10 TABLET, FILM COATED ORAL at 08:41

## 2018-07-18 RX ADMIN — FAMOTIDINE 20 MG: 20 TABLET, FILM COATED ORAL at 21:26

## 2018-07-18 RX ADMIN — IPRATROPIUM BROMIDE AND ALBUTEROL SULFATE 1 AMPULE: .5; 3 SOLUTION RESPIRATORY (INHALATION) at 19:56

## 2018-07-18 RX ADMIN — LURASIDONE HYDROCHLORIDE 40 MG: 40 TABLET, FILM COATED ORAL at 08:41

## 2018-07-18 RX ADMIN — FERROUS SULFATE TAB EC 325 MG (65 MG FE EQUIVALENT) 325 MG: 325 (65 FE) TABLET DELAYED RESPONSE at 08:41

## 2018-07-18 RX ADMIN — METHYLPREDNISOLONE SODIUM SUCCINATE 40 MG: 125 INJECTION, POWDER, FOR SOLUTION INTRAMUSCULAR; INTRAVENOUS at 03:09

## 2018-07-18 RX ADMIN — FAMOTIDINE 20 MG: 20 TABLET, FILM COATED ORAL at 08:41

## 2018-07-18 RX ADMIN — AMOXICILLIN AND CLAVULANATE POTASSIUM 1 TABLET: 875; 125 TABLET, FILM COATED ORAL at 21:26

## 2018-07-18 RX ADMIN — ALBUTEROL SULFATE 2.5 MG: 2.5 SOLUTION RESPIRATORY (INHALATION) at 23:32

## 2018-07-18 RX ADMIN — IPRATROPIUM BROMIDE AND ALBUTEROL SULFATE 1 AMPULE: .5; 3 SOLUTION RESPIRATORY (INHALATION) at 11:36

## 2018-07-18 RX ADMIN — METHYLPREDNISOLONE SODIUM SUCCINATE 40 MG: 125 INJECTION, POWDER, FOR SOLUTION INTRAMUSCULAR; INTRAVENOUS at 10:38

## 2018-07-18 RX ADMIN — SODIUM CHLORIDE 3 G: 900 INJECTION INTRAVENOUS at 05:47

## 2018-07-18 RX ADMIN — ALBUTEROL SULFATE 2.5 MG: 2.5 SOLUTION RESPIRATORY (INHALATION) at 00:17

## 2018-07-18 RX ADMIN — FORMOTEROL FUMARATE DIHYDRATE 20 MCG: 20 SOLUTION RESPIRATORY (INHALATION) at 07:30

## 2018-07-18 ASSESSMENT — PAIN SCALES - GENERAL: PAINLEVEL_OUTOF10: 0

## 2018-07-18 NOTE — PROGRESS NOTES
[facility]    Progress Note    7/18/2018    9:55 AM    Name:   Shena Gonzales  MRN:     [id number]     Acct:      [de-identified]   Room:   [address] [place] [address] Day:  3  Admit Date:  7/15/2018  2:28 AM    PCP:   Cliff Romano PA-C  Code Status:  Full Code    Subjective:     C/C: Shortness of breath    Interval History Status: improved. Significant improvement in clinical status, especially SOB, able to walk on floor with out oxygen, gets little tired at end. No fevers or chills reported. Overall feels much more energetic today    Brief History:     Miss Kellie Olivarez is a nice 46year old lady with history of extensive tobacco abuse, stage IV COPD, history of MSSA pneumonia with cavitary lesions in left lung and bronchiectasis. She was admitted to [facility] ER with complaint of fatigue and tiredness, sleeping most of the time since return to [place] from [place]. Complains of much worsened coughing from baseline all last week and when finally she got here, tired and fatigued. Denies any SOB. Work up with CTA chest in [facility] hospital showed possible pneumonitis along with extensive chronic changes in left lung. Diagnosed as bronchiectasis with exacerbation, treated with IV antibiotics    Review of Systems:     Constitutional:  negative for chills, fevers, sweats  Respiratory: improved SOB overnight  Cardiovascular:  negative for chest pain, chest pressure/discomfort  Gastrointestinal:  negative for abdominal pain, constipation, diarrhea, nausea  Neurological:  negative for dizziness, headache    Medications: Allergies:     Allergies   Allergen Reactions    Aspirin Anaphylaxis and Hives     Hives, lips swell    Benadryl [Diphenhydramine] Anaphylaxis       Current Meds:   Scheduled Meds:    albuterol  2.5 mg Nebulization BID    ferrous sulfate  325 mg Oral BID WC    formoterol  20 mcg Nebulization BID    escitalopram  20 mg Oral Daily    regular rate and rhythm, no murmur  Abdomen:  soft, nontender, nondistended, normal bowel sounds, no masses, hepatomegaly, splenomegaly  Extremities:  no edema, redness, tenderness in the calves  Skin:  no gross lesions, rashes, induration    Assessment:        Primary Problem  Bronchiectasis with acute lower respiratory infection [facility])    Active Hospital Problems    Diagnosis Date Noted    Lung infiltrate on CT [R91.8] 07/15/2018    Hx of bronchiectasis [Z87.09] 07/15/2018    History of adrenal adenoma [Z86.2] 07/15/2018    Thrombocytosis ([address] [D47.3] 07/15/2018    Bronchiectasis with acute lower respiratory infection ([address] [J47.0] 07/15/2018    COPD exacerbation ([address] [J44.1] 07/14/2018     Plan:        Principal Problem:    Bronchiectasis with acute lower respiratory infection ([address]): On Unasyn now. Sputum culture with mixed bacterial morphocytes. Still has some wheezing. Possible IV antibiotics for next 24 hours. Continued close follow up    COPD exacerbation ([address]): Solumedrol and inhalers continued at this time. Showed imaging with patient at bedside    Mild protein calorie malnutrition    Lung infiltrate on CT    Hx of bronchiectasis    History of adrenal adenoma    Thrombocytosis ([address] Cezar 2 mutation awaited.            Sera Dykes MD  7/18/2018  9:55 AM

## 2018-07-18 NOTE — PROGRESS NOTES
[facility], [facility]. COPD (chronic obstructive pulmonary disease) ([facility] [J44.9] . Vitals:    07/18/18 0839   BP: 114/72   Pulse: 93   Resp: 19   Temp: 98.4 °F (36.9 °C)   SpO2: 96%   . Patients home meds are   Prior to Admission medications    Medication Sig Start Date End Date Taking? Authorizing Provider   lurasidone (LATUDA) 40 MG TABS tablet Take 40 mg by mouth daily   Yes Historical Provider, MD   escitalopram (LEXAPRO) 20 MG tablet Take 20 mg by mouth daily   Yes Historical Provider, MD   albuterol (PROVENTIL) (2.5 MG/3ML) 0.083% nebulizer solution Take 3 mLs by nebulization 4 times daily 5/9/18 6/8/18  Carolina Leblanc MD   budesonide (PULMICORT) 0.5 MG/2ML nebulizer suspension Take 2 mLs by nebulization 2 times daily 5/9/18 6/8/18  Carolina Leblanc MD   ipratropium-albuterol (DUONEB) 0.5-2.5 (3) MG/3ML SOLN nebulizer solution Inhale 3 mLs into the lungs every 4 hours 5/9/18 6/8/18  Carolina Leblanc MD   Arformoterol Tartrate (BROVANA) 15 MCG/2ML NEBU Take 1 ampule by nebulization 2 times daily    Historical Provider, MD   traZODone (DESYREL) 50 MG tablet Take 150 mg by mouth nightly     Historical Provider, MD   .  Recent Surgical History:   Assessment BS insp. And exp.  Wheeze does take tx around the clock at home  Peak Flow (asthma only)    RR 18  Breath Sounds:insp.& exp wheeze    · Bronchodilator assessment at level  4  · Hyperinflation assessment at level   · Secretion Management assessment at level    ·   · [x]    Bronchodilator Assessment  BRONCHODILATOR ASSESSMENT SCORE  Score 0 1 2 3 4 5   Breath Sounds   []  Patient Baseline []  No Wheeze good aeration []  Faint, scattered wheezing, good aeration []  Expiratory Wheezing and or moderately diminished [x]  Insp/Exp wheeze and/or very diminished []  Insp/Exp and/ or marked distress   Respiratory Rate   []  Patient Baseline []  Less than 20 [x]  Less than 20 []  20-25 []  Greater than 25 []  Greater than 25   Peak flow % of Pred or PB []  NA   []  Greater than 90%  []  81-90% []  71-80% []  Less than or equal to 70%  or unable to perform []  Unable due to Respiratory Distress   Dyspnea re []  Patient Baseline []  No SOB []  No SOB [x]  SOB on exertion []  SOB min activity []  At rest/acute   e FEV% Predicted       []  NA []  Above 69%  []  Unable []  Above 60-69%  []  Unable []  Above 50-59%  []  Unable []  Above 35-49%  []  Unable []  Less than 35%  []  Unable                 []  Hyperinflation Assessment  Score 1 2 3   CXR and Breath Sounds   []  Clear []  No atelectasis  Basilar aeration []  Atelectasis or absent basilar breath sounds   Incentive Spirometry Volume  (Per IBW)   []  Greater than or equal to 15ml/Kg []  less than 15ml/Kg []  less than 15ml/Kg   Surgery within last 2 weeks []  None or general   []  Abdominal or thoracic surgery  []  Abdominal or thoracic   Chronic Pulmonary Historyre []  No []  Yes []  Yes     []  Secretion Management Assessment  Score 1 2 3   Bilateral Breath Sounds   []  Occasional Rhonchi []  Scattered Rhonchi []  Course Rhonchi and/or poor aeration   Sputum    []  Small amount of thin secretions []  Moderate amount of viscous secretions []  Copius, Viscious Yellow/ Secretions   CXR as reported by physician []  clear  []  Unavailable []  Infiltrates and/or consolidation  []  Unavailable []  Mucus Plugging and or lobar consolidation  []  Unavailable   Cough []  Strong, productive cough []  Weak productive cough []  No cough or weak non-productive cough   NICHOLAS CAMEJO  10:38 AM                            FEMALE                                  MALE                            FEV1 Predicted Normal Values                        FEV1 Predicted Normal Values          Age                                     Height in Feet and Inches       Age                                     Height in Feet and Inches       4' 11\" 5' 1\" 5' 3\" 5' 5\" 5' 7\" 5' 9\" 5' 11\" 6' 1\"  4' 11\" 5' 1\" 5' 3\" 5' 5\" 5' 7\" 5' 9\" 5' 11\" 6' 1\"   43 - 45 2.49 2.66 2.84 3.03 3.22 3.42 3.62 3.83 42 - 45 2.82 3.03 3.26 3.49 3.72 3.96 4.22 4.47   46 - 49 2.40 2.57 2.76 2.94 3.14 3.33 3.54 3.75 46 - 49 2.70 2.92 3.14 3.37 3.61 3.85 4.10 4.36   50 - 53 2.31 2.48 2.66 2.85 3.04 3.24 3.45 3.66 50 - 53 2.58 2.80 3.02 3.25 3.49 3.73 3.98 4.24   54 - 57 2.21 2.38 2.57 2.75 2.95 3.14 3.35 3.56 54 - 57 2.46 2.67 2.89 3.12 3.36 3.60 3.85 4.11   58 - 61 2.10 2.28 2.46 2.65 2.84 3.04 3.24 3.45 58 - 61 2.32 2.54 2.76 2.99 3.23 3.47 3.72 3.98   62 - 65 1.99 2.17 2.35 2.54 2.73 2.93 3.13 3.34 62 - 65 2.19 2.40 2.62 2.85 3.09 3.33 3.58 3.84   66 - 69 1.88 2.05 2.23 2.42 2.61 2.81 3.02 3.23 66 - 69 2.04 2.26 2.48 2.71 2.95 3.19 3.44 3.70   70+ 1.82 1.99 2.17 2.36 2.55 2.75 2.95 3.16 70+ 1.97 2.19 2.41 2.64 2.87 3.12 3.37 3.62             Predicted Peak Expiratory Flow Rate                                       Height (in)  Female       Height (in) Male           Age 64 63 56 61 58 73 78 74 Age            21 344 357 372 387 402 417 432 446  60 62 64 66 68 70 72 74 76   25 337 352 366 381 396 411 426 441 25 447 476 505 533 [phone] 677   30 329 344 359 374 389 404 419 434 30 437 466 494 523 552 580 609 638 667   35 322 337 351 366 381 396 411 426 35 426 455 484 512 541 570 598 627 657   40 314 329 344 359 374 389 404 419 40 416 445 473 502 531 559 588 617 647   45 307 322 336 351 366 381 396 411 45 405 434 463 491 520 549 577 606 636   50 299 314 329 344 359 374 389 404 50 395 424 452 481 510 538 567 596 625   55 292 307 321 336 351 366 381 396 55 384 413 442 470 499 528 556 585 615   60 284 299 314 329 344 359 374 389 60 374 403 431 460 489 517 546 575 605   65 277 292 306 321 336 351 366 381 65 363 392 421 449 478 507 535 564 594   70 269 284 299 314 329 344 359 374 70 353 382 410 439 468 496 525 554 583   75 261 274 289 305 319 334 348 364 75 344 372 400 429 458 487 515 544 573   80 253 266 282 296 312 327 342 356 80 335 362 390 419 448 476 505 534 562 BRONCHOSPASM/BRONCHOCONSTRICTION     [x]         IMPROVE AERATION/BREATH SOUNDS  [x]   ADMINISTER BRONCHODILATOR THERAPY AS APPROPRIATE  [x]   ASSESS BREATH SOUNDS  [x]   IMPLEMENT AEROSOL/MDI PROTOCOL  [x]   PATIENT EDUCATION AS NEEDED

## 2018-07-18 NOTE — PROGRESS NOTES
Date:                           7/18/2018  Patient name:           Lily Wyatt  Date of admission:  7/15/2018  2:28 AM  MRN:   [id number]  YOB: 1970  PCP:                           oHmar Bello PA-C        Reason for consult: Thrombocytosis  Subjective:   Patient seen and examined. Labs in vitals reviewed. Patient's symptom of shortness of breath have improved  Patient starting to feel better now. Awaiting CBC from this morning  Obtain records from patient's primary care physician know labs from 2017 available for comparison. REVIEW OF SYSTEMS:  General: no fever or night sweats, Weight is stable. ENT: No double or blurred vision, no hearing problem, no dysphagia or sore throat   Respiratory: Positive for shortness of breath  Cardiovascular: Denies chest pain, PND or orthopnea. No L E swelling or palpitations. Gastrointestinal:    No nausea or vomiting, abdominal pain, diarrhea or constipation. Genitourinary: Denies dysuria, hematuria, frequency, urgency or incontinence. Neurological: Denies headaches, decreased LOC, no sensory or motor focal deficits. Musculoskeletal:  No arthralgia no back pain or joint swelling. Skin: There are no rashes or bleeding. Psych: Denies hallucinations or intentions to harm self        Objective:     Vitals: /64   Pulse 85   Temp 97.7 °F (36.5 °C) (Oral)   Resp 24   Ht 5' 5\" (1.651 m)   Wt 121 lb (54.9 kg)   SpO2 95%   BMI 20.14 kg/m²   General appearance - well appearing, no in pain or distress  Mental status - AAO X3  Eyes - pupils equal and reactive, extraocular eye movements intact  Mouth - mucous membranes moist, pharynx normal without lesions  Neck - supple, no significant adenopathy  Lymphatics - no palpable lymphadenopathy, no hepatosplenomegaly  Chest - Decreased breathing sounds bilaterally.   Positive rales  Heart - normal rate, regular rhythm, normal S1, S2, no murmurs  Abdomen - soft, nontender, nondistended, no masses or organomegaly  Neurological - alert, oriented, normal speech, no focal findings or movement disorder noted  Extremities - peripheral pulses normal, no pedal edema, no clubbing or cyanosis  Skin - normal coloration and turgor, no rashes, no suspicious skin lesions noted         Data:    I/O this shift:  In: 1350 [P.O.:1350]  Out: 1150 [Urine:1150]  In: 6056 [P.O.:2550; I.V.:1963]  Out: 2950 [Urine:2950]    CBC:   Recent Labs      07/15/18   1227  07/16/18   1121   WBC  23.7*  27.7*   HGB  10.9*  10.3*   PLT  829*  807*     BMP:    Recent Labs      07/15/18   1227   NA  133*   K  4.8   CL  94*   CO2  27   BUN  15   CREATININE  0.34*   GLUCOSE  210*     Hepatic: No results for input(s): AST, ALT, ALB, BILITOT, ALKPHOS in the last 72 hours. INR: No results for input(s): INR in the last 72 hours. PTT:No results for input(s): PTT in the last 72 hours. Results for orders placed or performed during the hospital encounter of 07/15/18   Sputum gram stain   Result Value Ref Range    Specimen Description . EXPECTORATED SPUTUM     Special Requests NOT REPORTED     Direct Exam DUPLICATE ORDER     Direct Exam       GRAM STAINS SHOULD NEVER BE ORDERED ON RESPIRATORY CULTURES AS THEY ARE ALWAYS    Direct Exam  INCLUDED     Status FINAL 07/15/2018    Respiratory Culture   Result Value Ref Range    Specimen Description . EXPECTORATED SPUTUM     Special Requests NOT REPORTED     Direct Exam < 10 EPITHELIAL CELLS/LPF     Direct Exam >25 NEUTROPHILS/LPF     Direct Exam (A)      VERY SCANT MIXED BACTERIAL MORPHOTYPES SEEN ON GRAM STAIN. Culture NORMAL RESPIRATORY EB MODERATE GROWTH     Status FINAL 07/17/2018    Culture Blood #1   Result Value Ref Range    Specimen Description . BLOOD     Special Requests lt forearm     Culture NO GROWTH 3 DAYS     Status Pending    Basic Metabolic Panel w/ Reflex to MG   Result Value Ref Range    Glucose 210 (H) 70 - 99 mg/dL    BUN 15 6 - 20 mg/dL    CREATININE 0.34 (L) 0. 83       RBC (K/uL)     3.64                SEGS               90          24.93  HGB (G/dL)     10.3               LYMPHS          6          1.66  HCT (%)     33.0                                     MCV (FL.)     90.7               MONOS          1          0.28  MCH (PG.)     28.3                                          MCHC (g/dL)     31.2                                          RDW (%)     16.7                                          PLT (k/uL)     807                                          RETIC (%)     1.1                                     Absolute RetCt     0.040                                                                                                                       PERIPHERAL EXAMINATION BY PATHOLOGIST    PLATELETS: Normal          LEUKOCYTES: Minimal neutrophilic left shift      ERYTHROCYTES: Anisocytosis, otherwise normal    Reena Morton M.D. Source:  1: PERIPHERAL BLOOD FOR REVIEW BY PATHOLOGIST         Xr Chest Standard (2 Vw)    Result Date: 7/16/2018  EXAMINATION: TWO VIEWS OF THE CHEST 7/16/2018 8:37 am COMPARISON: CT chest from outside institution on 7/14/2018. HISTORY: ORDERING SYSTEM PROVIDED HISTORY: Pneumonia TECHNOLOGIST PROVIDED HISTORY: Reason for exam:->Pneumonia FINDINGS: Cardiac and mediastinal contours are unchanged. Unchanged pleural thickening with cavitary findings, better seen on CT examination. This is likely secondary to differences between CT and radiographic technique. Patchy opacities within the left mid lung appear unchanged. Unchanged reticulonodular pulmonary opacities bilaterally. No significant pleural effusion. No evidence of pneumothorax. No acute osseous abnormalities. 1. No significant interval change since 7/14/2018. Unchanged cavitary findings within the left lung, with patchy left midlung pulmonary opacities. Recommend radiographic follow-up to complete resolution.  2. No evidence of pneumothorax or significant

## 2018-07-18 NOTE — PLAN OF CARE
Problem: Falls - Risk of:  Goal: Will remain free from falls  Will remain free from falls   Outcome: Ongoing  No falls this shift. Patient up independently. Precautions include posted falling star sign, nonskid footwear on, bed locked in lowest position, siderails up x2, table and call light within reach. Patient calls out appropriately for assistance. Hourly rounding to assess for needs. Problem: Breathing Pattern - Ineffective:  Goal: Ability to achieve and maintain a regular respiratory rate will improve  Ability to achieve and maintain a regular respiratory rate will improve  Outcome: Ongoing  States she has had minimal cough, clear to white sputum only. No chest pain. Minimal to no shortness of breath with ambulation. Wheezes throughout. On IV antibiotics and steroids. Able to maintain O2 saturations >92% off of supplemental oxygen at this time.

## 2018-07-18 NOTE — FLOWSHEET NOTE
The patient moved to the area for her grand ids. She reports that it bothers her that she is so sick as she does not have the energy or breathing ability to spend the time with them that she wants to. She reports recently losing her job which has caused her some anxiety but believes that God is watching over her and will help her cope.       07/17/18 2056   Encounter Summary   Services provided to: Patient   Referral/Consult From: [address] [place] [address] Family members   Continue Visiting (7/17/18)   Complexity of Encounter Moderate   Length of Encounter 30 minutes   Spiritual Assessment Completed Yes   Spiritual/Cheondoism   Type Spiritual support   Assessment Peaceful; Approachable; Concerns with suffering;Questioning meaning/purpose   Intervention Prayer;Nurtured hope;Explored coping resources; Explored feelings, thoughts, concerns; Active listening   Outcome Shared life review;Expressed gratitude; Connection/belonging;Comfort

## 2018-07-18 NOTE — PROGRESS NOTES
(Oral)   Resp 19   Ht 5' 5\" (1.651 m)   Wt 121 lb (54.9 kg)   SpO2 96%   BMI 20.14 kg/m²   24 hour intake/output:  Intake/Output Summary (Last 24 hours) at 07/18/18 1146  Last data filed at 07/18/18 0548   Gross per 24 hour   Intake             4513 ml   Output             2950 ml   Net             1563 ml     Last 3 weights: Wt Readings from Last 3 Encounters:   07/18/18 121 lb (54.9 kg)   02/19/18 121 lb (54.9 kg)   02/19/18 121 lb (54.9 kg)         Physical Examination:   PHYSICAL EXAMINATION:  Vitals:    07/17/18 2315 07/18/18 0310 07/18/18 0545 07/18/18 0839   BP: 139/70 119/64  114/72   Pulse: 91 85  93   Resp: 26 24 19   Temp: 97.9 °F (36.6 °C) 97.7 °F (36.5 °C)  98.4 °F (36.9 °C)   TempSrc: Oral Oral  Oral   SpO2: 95% 95%  96%   Weight:   121 lb (54.9 kg)    Height:         Constitutional: This is a well developed, well nourished. Respiratory: Chest was symmetrical without dullness to percussion. Decreased breath sound bilaterally with prolonged expiration and expiratory wheezing bilaterally. There is no intercostal retraction or use of accessory muscles. No egophony noted. Cardiovascular: Regular without murmur, clicks, gallops or rubs. Abdomen: Slightly rounded and soft without organomegaly. No rebound tenderness, rigidity or guarding was appreciated. Lymphatic: No lymphadenopathy. Musculoskeletal: Normal curvature of the spine. No gross muscle weakness. Extremities:  No lower extremity edema, ulcerations, tenderness, varicosities or erythema. Muscle size, tone and strength are normal.  No involuntary movements are noted. Skin:  Warm and dry. Good color, turgor and pigmentation. No lesions or scars.   No cyanosis or clubbing  Neurological/Psychiatric: The patient's general behavior, level of consciousness, thought content and emotional status is normal.          CBC:   Recent Labs      07/15/18   1227  07/16/18   1121  07/18/18   0819   WBC  23.7*  27.7*  32.3*   HGB  10.9*  10.3* 11.4*   PLT  829*  807*  932*     BMP:  Recent Labs      07/15/18   1227   NA  133*   K  4.8   CL  94*   CO2  27   BUN  15   CREATININE  0.34*   GLUCOSE  210*     Calcium:  Recent Labs      07/15/18   1227   CALCIUM  8.5*     Ionized Calcium:No results for input(s): IONCA in the last 72 hours. Magnesium:No results for input(s): MG in the last 72 hours. Phosphorus:No results for input(s): PHOS in the last 72 hours. BNP:No results for input(s): BNP in the last 72 hours. Glucose:No results for input(s): POCGLU in the last 72 hours. HgbA1C: No results for input(s): LABA1C in the last 72 hours. INR: No results for input(s): INR in the last 72 hours. Hepatic: No results for input(s): ALKPHOS, ALT, AST, PROT, BILITOT, BILIDIR, LABALBU in the last 72 hours. Amylase and Lipase:No results for input(s): LACTA, AMYLASE in the last 72 hours. Lactic Acid: No results for input(s): LACTA in the last 72 hours. CARDIAC ENZYMES:No results for input(s): CKTOTAL, CKMB, CKMBINDEX, TROPONINI in the last 72 hours. BNP: No results for input(s): BNP in the last 72 hours. Lipids: No results for input(s): CHOL, TRIG, HDL, LDLCALC in the last 72 hours. Invalid input(s): LDL  ABGs: No results found for: PH, PCO2, PO2, HCO3, O2SAT  Thyroid:   Lab Results   Component Value Date    TSH 1.63 01/12/2018      Urinalysis: No results for input(s): BACTERIA, BLOODU, CLARITYU, COLORU, PHUR, PROTEINU, RBCUA, SPECGRAV, BILIRUBINUR, NITRU, WBCUA, LEUKOCYTESUR, GLUCOSEU in the last 72 hours. CULTURES:          Assessment:    Principal Problem:    Bronchiectasis with acute lower respiratory infection ([address]  Active Problems:    COPD exacerbation (HCC)    Lung infiltrate on CT    Hx of bronchiectasis    History of adrenal adenoma    Thrombocytosis (HCC)  Resolved Problems:    * No resolved hospital problems. *      Plan:  Chnage IV Unasyn to Augmentin. Continue IV Solu-Medrol. We will change to tapering dose tomorrow.   Leukocytosis likely

## 2018-07-18 NOTE — PLAN OF CARE
Problem: Falls - Risk of:  Goal: Will remain free from falls  Will remain free from falls   Outcome: Met This Shift  Pt remains free of falls at this time. Bed locked in lowest position, siderails x2, call light in reach. Non-skid footwear applied. Pt ambulates in room with steady gait. Encouraged pt to call for assistance as needed for safety. Falling star posted outside of room. Will continue to monitor.

## 2018-07-19 ENCOUNTER — TELEPHONE (OUTPATIENT)
Dept: PULMONOLOGY | Age: 48
End: 2018-07-19

## 2018-07-19 VITALS
WEIGHT: 121 LBS | HEIGHT: 65 IN | RESPIRATION RATE: 20 BRPM | DIASTOLIC BLOOD PRESSURE: 62 MMHG | BODY MASS INDEX: 20.16 KG/M2 | HEART RATE: 99 BPM | SYSTOLIC BLOOD PRESSURE: 120 MMHG | TEMPERATURE: 98.1 F | OXYGEN SATURATION: 92 %

## 2018-07-19 PROCEDURE — 94762 N-INVAS EAR/PLS OXIMTRY CONT: CPT

## 2018-07-19 PROCEDURE — 2580000003 HC RX 258: Performed by: NURSE PRACTITIONER

## 2018-07-19 PROCEDURE — 6370000000 HC RX 637 (ALT 250 FOR IP): Performed by: INTERNAL MEDICINE

## 2018-07-19 PROCEDURE — 6370000000 HC RX 637 (ALT 250 FOR IP): Performed by: STUDENT IN AN ORGANIZED HEALTH CARE EDUCATION/TRAINING PROGRAM

## 2018-07-19 PROCEDURE — 6360000002 HC RX W HCPCS: Performed by: INTERNAL MEDICINE

## 2018-07-19 PROCEDURE — 99232 SBSQ HOSP IP/OBS MODERATE 35: CPT | Performed by: INTERNAL MEDICINE

## 2018-07-19 PROCEDURE — 6360000002 HC RX W HCPCS: Performed by: NURSE PRACTITIONER

## 2018-07-19 PROCEDURE — 6370000000 HC RX 637 (ALT 250 FOR IP): Performed by: NURSE PRACTITIONER

## 2018-07-19 PROCEDURE — 94640 AIRWAY INHALATION TREATMENT: CPT

## 2018-07-19 PROCEDURE — 99239 HOSP IP/OBS DSCHRG MGMT >30: CPT | Performed by: INTERNAL MEDICINE

## 2018-07-19 RX ORDER — PREDNISONE 20 MG/1
TABLET ORAL
Qty: 11 TABLET | Refills: 0 | Status: SHIPPED | OUTPATIENT
Start: 2018-07-19 | End: 2018-08-07 | Stop reason: ALTCHOICE

## 2018-07-19 RX ORDER — AMOXICILLIN AND CLAVULANATE POTASSIUM 875; 125 MG/1; MG/1
1 TABLET, FILM COATED ORAL EVERY 12 HOURS SCHEDULED
Qty: 28 TABLET | Refills: 0 | Status: SHIPPED | OUTPATIENT
Start: 2018-07-19 | End: 2018-08-02

## 2018-07-19 RX ORDER — BUDESONIDE 0.5 MG/2ML
500 INHALANT ORAL 2 TIMES DAILY
Qty: 60 AMPULE | Refills: 3 | Status: SHIPPED | OUTPATIENT
Start: 2018-07-19

## 2018-07-19 RX ORDER — LANOLIN ALCOHOL/MO/W.PET/CERES
325 CREAM (GRAM) TOPICAL 2 TIMES DAILY WITH MEALS
Qty: 60 TABLET | Refills: 1 | Status: SHIPPED | OUTPATIENT
Start: 2018-07-19 | End: 2018-08-07 | Stop reason: SDUPTHER

## 2018-07-19 RX ORDER — IPRATROPIUM BROMIDE AND ALBUTEROL SULFATE 2.5; .5 MG/3ML; MG/3ML
1 SOLUTION RESPIRATORY (INHALATION) EVERY 6 HOURS PRN
Qty: 360 ML | Refills: 1 | Status: SHIPPED | OUTPATIENT
Start: 2018-07-19

## 2018-07-19 RX ADMIN — METHYLPREDNISOLONE SODIUM SUCCINATE 40 MG: 125 INJECTION, POWDER, FOR SOLUTION INTRAMUSCULAR; INTRAVENOUS at 04:13

## 2018-07-19 RX ADMIN — FERROUS SULFATE TAB EC 325 MG (65 MG FE EQUIVALENT) 325 MG: 325 (65 FE) TABLET DELAYED RESPONSE at 08:17

## 2018-07-19 RX ADMIN — FAMOTIDINE 20 MG: 20 TABLET, FILM COATED ORAL at 08:23

## 2018-07-19 RX ADMIN — LURASIDONE HYDROCHLORIDE 40 MG: 40 TABLET, FILM COATED ORAL at 08:18

## 2018-07-19 RX ADMIN — FORMOTEROL FUMARATE DIHYDRATE 20 MCG: 20 SOLUTION RESPIRATORY (INHALATION) at 08:49

## 2018-07-19 RX ADMIN — IPRATROPIUM BROMIDE AND ALBUTEROL SULFATE 1 AMPULE: .5; 3 SOLUTION RESPIRATORY (INHALATION) at 08:49

## 2018-07-19 RX ADMIN — SODIUM CHLORIDE: 9 INJECTION, SOLUTION INTRAVENOUS at 04:12

## 2018-07-19 RX ADMIN — AMOXICILLIN AND CLAVULANATE POTASSIUM 1 TABLET: 875; 125 TABLET, FILM COATED ORAL at 08:18

## 2018-07-19 RX ADMIN — ESCITALOPRAM 20 MG: 10 TABLET, FILM COATED ORAL at 08:17

## 2018-07-19 RX ADMIN — ALBUTEROL SULFATE 2.5 MG: 2.5 SOLUTION RESPIRATORY (INHALATION) at 04:55

## 2018-07-19 NOTE — DISCHARGE SUMMARY
[facility]    Discharge Summary     Patient ID: Miguel Hill  :  1970   MRN: [id number]     ACCOUNT:  [de-identified]   Patient's PCP: David Irwin PA-C  Admit Date: 7/15/2018   Discharge Date: 2018     Length of Stay: 4  Code Status:  Full Code  Admitting Physician: Sera Dykes MD  Discharge Physician: Sera Dykes MD     Active Discharge Diagnoses:     Hospital Problem Lists:  Principal Problem:    Bronchiectasis with acute lower respiratory infection ([facility]  Active Problems:    COPD exacerbation ([facility]    Lung infiltrate on CT    Hx of bronchiectasis    History of adrenal adenoma    Thrombocytosis ([facility]  Resolved Problems:    * No resolved hospital problems. *      Admission Condition:  poor     Discharged Condition: fair    Hospital Stay:     Hospital Course: Miss Alyson Cornell is a nice 46year old lady with history of extensive tobacco abuse, stage IV COPD, history of MSSA pneumonia with cavitary lesions in left lung and bronchiectasis. She was admitted to [facility] ER with complaint of fatigue and tiredness, sleeping most of the time since return to [place] from [place]. Complains of much worsened coughing from baseline all last week and when finally she got here, tired and fatigued. Denies any SOB. Work up with [facility] chest in [facility] showed possible pneumonitis along with extensive chronic changes in left lung. Diagnosed as saccular bronchiectasis in left lower lobe with exacerbation, treated with IV Unasyn during admission. Also noted thrombocytosis at around 032480 with negative Cezar 2 mutation. Started iron therapy with recommended oncology follow up    Significant therapeutic interventions: none    Significant Diagnostic Studies:   Xr Chest Standard (2 Vw)    Result Date: 2018  EXAMINATION: TWO VIEWS OF THE CHEST 2018 8:37 am COMPARISON: CT chest from outside institution on 2018.  HISTORY: ORDERING SYSTEM PROVIDED HISTORY: Pneumonia TECHNOLOGIST PROVIDED HISTORY: Reason for exam:->Pneumonia FINDINGS: Cardiac and mediastinal contours are unchanged. Unchanged pleural thickening with cavitary findings, better seen on CT examination. This is likely secondary to differences between CT and radiographic technique. Patchy opacities within the left mid lung appear unchanged. Unchanged reticulonodular pulmonary opacities bilaterally. No significant pleural effusion. No evidence of pneumothorax. No acute osseous abnormalities. 1. No significant interval change since 7/14/2018. Unchanged cavitary findings within the left lung, with patchy left midlung pulmonary opacities. Recommend radiographic follow-up to complete resolution. 2. No evidence of pneumothorax or significant pleural effusion. Consultations:    Consults:     Final Specialist Recommendations/Findings:   IP CONSULT TO PULMONOLOGY  IP CONSULT TO ONCOLOGY  IP CONSULT TO PULMONOLOGY      The patient was seen and examined on day of discharge and this discharge summary is in conjunction with any daily progress note from day of discharge.     Discharge plan:     Disposition: Home    Physician Follow Up:   Pulmonary team in 4 weeks  Oncology in 2 months    Requiring Further Evaluation/Follow Up POST HOSPITALIZATION/Incidental Findings: none    Diet: cardiac diet    Activity: As tolerated    Instructions to Patient: none    Discharge Medications:      Medication List      START taking these medications    amoxicillin-clavulanate 875-125 MG per tablet  Commonly known as:  AUGMENTIN  Take 1 tablet by mouth every 12 hours for 14 days     ferrous sulfate 325 (65 Fe) MG EC tablet  Take 1 tablet by mouth 2 times daily (with meals)     predniSONE 20 MG tablet  Commonly known as:  DELTASONE  Take two tabs by mouth daily for 3 days, followed by one tab by mouth daily for 3 days, followed by half tablet by mouth daily for 3 days        CHANGE how you

## 2018-07-19 NOTE — PROGRESS NOTES
[facility]    Progress Note    7/19/2018    9:49 AM    Name:   Kayli Moore  MRN:     [id number]     Acct:      [de-identified]   Room:   [address] [place] [address] Day:  4  Admit Date:  7/15/2018  2:28 AM    PCP:   Snow Barnes PA-C  Code Status:  Full Code    Subjective:     C/C: Shortness of breath    Interval History Status: improved   Improvement in SOB since admission, no worsened coughing   No fevers or chills overnight    Brief History:     Miss Jess Olmedo is a nice 46year old lady with history of extensive tobacco abuse, stage IV COPD, history of MSSA pneumonia with cavitary lesions in left lung and bronchiectasis. She was admitted to [facility] ER with complaint of fatigue and tiredness, sleeping most of the time since return to [place] from [place]. Complains of much worsened coughing from baseline all last week and when finally she got here, tired and fatigued. Denies any SOB. Work up with CTA chest in [facility] hospital showed possible pneumonitis along with extensive chronic changes in left lung. Diagnosed as saccular bronchiectasis in left lower lobe with exacerbation, treated with IV Unasyn during admission. Also noted thrombocytosis at around 399808 with negative Cezar 2 mutation. Started iron therapy with recommended oncology follow up    Review of Systems:     Constitutional:  negative for chills, fevers, sweats  Respiratory: improved SOB overnight  Cardiovascular:  negative for chest pain, chest pressure/discomfort  Gastrointestinal:  negative for abdominal pain, constipation, diarrhea, nausea  Neurological:  negative for dizziness, headache    Medications: Allergies:     Allergies   Allergen Reactions    Aspirin Anaphylaxis and Hives     Hives, lips swell    Benadryl [Diphenhydramine] Anaphylaxis       Current Meds:   Scheduled Meds:    albuterol  2.5 mg Nebulization BID    amoxicillin-clavulanate  1 tablet Oral 2 times per day    nondistended, normal bowel sounds, no masses, hepatomegaly, splenomegaly  Extremities:  no edema, redness, tenderness in the calves  Skin:  no gross lesions, rashes, induration    Assessment:        Primary Problem  Bronchiectasis with acute lower respiratory infection [facility])    Active Hospital Problems    Diagnosis Date Noted    Lung infiltrate on CT [R91.8] 07/15/2018    Hx of bronchiectasis [Z87.09] 07/15/2018    History of adrenal adenoma [Z86.2] 07/15/2018    Thrombocytosis ([address] [D47.3] 07/15/2018    Bronchiectasis with acute lower respiratory infection ([address] [J47.0] 07/15/2018    COPD exacerbation ([address] [J44.1] 07/14/2018     Plan:        Principal Problem:    Bronchiectasis with acute lower respiratory infection ([address]): On augmentin now    COPD exacerbation ([address]): Solumedrol and inhalers continued at this time. Showed imaging with patient at bedside    Mild protein calorie malnutrition    Lung infiltrate on CT    Hx of bronchiectasis    History of adrenal adenoma    Thrombocytosis ([address] Cezar 2 mutation awaited.        [id number] Jalyn Devine for discharge home today      Robi Love MD  7/19/2018  9:49 AM

## 2018-07-19 NOTE — TELEPHONE ENCOUNTER
----- Message from Eugenio Mchugh MD sent at 7/18/2018  8:29 PM EDT -----   Please have the patient follow up in our office in 6 weeks thank you

## 2018-07-19 NOTE — PROGRESS NOTES
oriented, normal speech, no focal findings or movement disorder noted  Extremities - peripheral pulses normal, no pedal edema, no clubbing or cyanosis  Skin - normal coloration and turgor, no rashes, no suspicious skin lesions noted         Data:    No intake/output data recorded. No intake/output data recorded. CBC:   Recent Labs      07/16/18   1121  07/18/18   0819   WBC  27.7*  32.3*   HGB  10.3*  11.4*   PLT  807*  932*     BMP:    No results for input(s): NA, K, CL, CO2, BUN, CREATININE, GLUCOSE in the last 72 hours. Hepatic: No results for input(s): AST, ALT, ALB, BILITOT, ALKPHOS in the last 72 hours. INR: No results for input(s): INR in the last 72 hours. PTT:No results for input(s): PTT in the last 72 hours. Results for orders placed or performed during the hospital encounter of 07/15/18   Sputum gram stain   Result Value Ref Range    Specimen Description . EXPECTORATED SPUTUM     Special Requests NOT REPORTED     Direct Exam DUPLICATE ORDER     Direct Exam       GRAM STAINS SHOULD NEVER BE ORDERED ON RESPIRATORY CULTURES AS THEY ARE ALWAYS    Direct Exam  INCLUDED     Status FINAL 07/15/2018    Respiratory Culture   Result Value Ref Range    Specimen Description . EXPECTORATED SPUTUM     Special Requests NOT REPORTED     Direct Exam < 10 EPITHELIAL CELLS/LPF     Direct Exam >25 NEUTROPHILS/LPF     Direct Exam (A)      VERY SCANT MIXED BACTERIAL MORPHOTYPES SEEN ON GRAM STAIN. Culture NORMAL RESPIRATORY EB MODERATE GROWTH     Status FINAL 07/17/2018    Culture Blood #1   Result Value Ref Range    Specimen Description . BLOOD     Special Requests lt forearm     Culture NO GROWTH 4 DAYS     Status Pending    Basic Metabolic Panel w/ Reflex to MG   Result Value Ref Range    Glucose 210 (H) 70 - 99 mg/dL    BUN 15 6 - 20 mg/dL    CREATININE 0.34 (L) 0.50 - 0.90 mg/dL    Bun/Cre Ratio NOT REPORTED 9 - 20    Calcium 8.5 (L) 8.6 - 10.4 mg/dL    Sodium 133 (L) 135 - 144 mmol/L    Potassium 4.8 3.7 - 16. 7 (H) 11.8 - 14.4 %    Platelets 367 (H) 986 - 453 k/uL    MPV 8.9 8.1 - 13.5 fL    NRBC Automated 0.0 0.0 per 100 WBC    Differential Type NOT REPORTED     WBC Morphology NOT REPORTED     RBC Morphology NOT REPORTED     Platelet Estimate NOT REPORTED     Immature Granulocytes 3 (H) 0 %    Seg Neutrophils 90 (H) 36 - 66 %    Lymphocytes 6 (L) 24 - 44 %    Monocytes 1 1 - 7 %    Eosinophils % 0 (L) 1 - 4 %    Basophils 0 0 - 2 %    Absolute Immature Granulocyte 0.83 (H) 0.00 - 0.30 k/uL    Segs Absolute 24.93 (H) 1.8 - 7.7 k/uL    Absolute Lymph # 1.66 1.0 - 4.8 k/uL    Absolute Mono # 0.28 0.1 - 0.8 k/uL    Absolute Eos # 0.00 0.0 - 0.4 k/uL    Basophils # 0.00 0.0 - 0.2 k/uL    Morphology ANISOCYTOSIS PRESENT    Reticulocytes   Result Value Ref Range    Retic % 1.1 0.5 - 1.9 %    Absolute Retic # 0.040 0.030 - 0.080 M/uL    Immature Retic Fract 30.500 (H) 2.7 - 18.3 %    Retic Hemoglobin 27.3 (L) 28.2 - 35.7 pg   Surgical Pathology   Result Value Ref Range    Surgical Pathology Report       (NOTE)  [id number]  [address] [place] [address]. [place], [address]  [phone]  Fax: [phone]  0 [facility]    Patient Name: Vic Kent  MR#: [id number]  Specimen #[id number]    Procedures/Addenda  PERIPHERAL BLOOD REPORT     Date Ordered:     7/17/2018     Status:  Signed Out      Date Complete:     7/17/2018     By: Graciela Mae M.D. Date Reported:     7/17/2018       INTERPRETATION  PERIPHERAL BLOOD: NEUTROPHILIA, THROMBOCYTOSIS AND MILD NORMOCYTIC  ANEMIA, FAVORING HYPOPROLIFERATIVE ANEMIA. THROMBOCYTOSIS PERSISTENT  FROM 1/2018. REQUEST FOR JAK2 MUTATION IS NOTED AND PATIENT BEING  SEEN CONSULTATION BY HEMATOLOGY/ ONCOLOGY.       RESULTS-COMMENTS                          PERIPHERAL BLOOD STUDY    HEMOGRAM                              DIFFERENTIAL %     ABSOLUTE  (K/UL)    WBC (K/uL)     27.7               IMM GRANS          3          0.83       RBC (K/uL)     3.64                SEGS               90          24.93  HGB (G/dL)     10.3               LYMPHS          6          1.66  HCT (%)     33.0                                     MCV (FL.)     90.7               MONOS          1          0.28  MCH (PG.)     28.3                                          MCHC (g/dL)     31.2                                          RDW (%)     16.7                                          PLT (k/uL)     807                                          RETIC (%)     1.1                                     Absolute RetCt     0.040                                                                                                                       PERIPHERAL EXAMINATION BY PATHOLOGIST    PLATELETS: Normal          LEUKOCYTES: Minimal neutrophilic left shift      ERYTHROCYTES: Anisocytosis, otherwise normal    Brooke Dominguez M.D.                  Source:  1: PERIPHERAL BLOOD FOR REVIEW BY PATHOLOGIST     CBC Auto Differential   Result Value Ref Range    WBC 32.3 (HH) 3.5 - 11.3 k/uL    RBC 4.07 3.95 - 5.11 m/uL    Hemoglobin 11.4 (L) 11.9 - 15.1 g/dL    Hematocrit 35.6 (L) 36.3 - 47.1 %    MCV 87.5 82.6 - 102.9 fL    MCH 28.0 25.2 - 33.5 pg    MCHC 32.0 28.4 - 34.8 g/dL    RDW 16.9 (H) 11.8 - 14.4 %    Platelets 229 (H) 184 - 453 k/uL    MPV 8.6 8.1 - 13.5 fL    NRBC Automated 0.1 (H) 0.0 per 100 WBC    Differential Type NOT REPORTED     WBC Morphology NOT REPORTED     RBC Morphology NOT REPORTED     Platelet Estimate NOT REPORTED     Immature Granulocytes 18 (H) 0 %    Seg Neutrophils 66 36 - 66 %    Lymphocytes 14 (L) 24 - 44 %    Monocytes 2 1 - 7 %    Eosinophils % 0 (L) 1 - 4 %    Basophils 0 0 - 2 %    Absolute Immature Granulocyte 5.81 (H) 0.00 - 0.30 k/uL    Segs Absolute 21.32 (H) 1.8 - 7.7 k/uL    Absolute Lymph # 4.52 1.0 - 4.8 k/uL    Absolute Mono # 0.65 0.1 - 0.8 k/uL    Absolute Eos # 0.00 0.0 - 0.4 k/uL    Basophils # 0.00 0.0 - 0.2 k/uL    Morphology ANISOCYTOSIS PRESENT        Xr Chest Standard (2 Vw)    Result Date: 7/16/2018  EXAMINATION: TWO VIEWS OF THE CHEST 7/16/2018 8:37 am COMPARISON: CT chest from outside institution on 7/14/2018. HISTORY: ORDERING SYSTEM PROVIDED HISTORY: Pneumonia TECHNOLOGIST PROVIDED HISTORY: Reason for exam:->Pneumonia FINDINGS: Cardiac and mediastinal contours are unchanged. Unchanged pleural thickening with cavitary findings, better seen on CT examination. This is likely secondary to differences between CT and radiographic technique. Patchy opacities within the left mid lung appear unchanged. Unchanged reticulonodular pulmonary opacities bilaterally. No significant pleural effusion. No evidence of pneumothorax. No acute osseous abnormalities. 1. No significant interval change since 7/14/2018. Unchanged cavitary findings within the left lung, with patchy left midlung pulmonary opacities. Recommend radiographic follow-up to complete resolution. 2. No evidence of pneumothorax or significant pleural effusion. Problem Lists:   Primary Problem:  Bronchiectasis with acute lower respiratory infection ([address]   Current Problems:  Active Hospital Problems    Diagnosis Date Noted    Lung infiltrate on CT [R91.8] 07/15/2018    Hx of bronchiectasis [Z87.09] 07/15/2018    History of adrenal adenoma [Z86.2] 07/15/2018    Thrombocytosis ([address] [D47.3] 07/15/2018    Bronchiectasis with acute lower respiratory infection ([address] [J47.0] 07/15/2018    COPD exacerbation ([address] [J44.1] 07/14/2018     PMH:  Past Medical History:   Diagnosis Date    Abnormal PFTs 1/16/2018    Adenoma of left adrenal gland 1/16/2018    Adrenal adenoma, right 1/16/2018    Cavitary pneumonia 10/2016    Right lung    COPD (chronic obstructive pulmonary disease) (HCC)     Mass of left lung 1/16/2018    Smoker     3 ppd since 14yr of age      Allergies:    Allergies   Allergen Reactions    Aspirin Anaphylaxis and Hives

## 2018-07-19 NOTE — CARE COORDINATION
Discharge [address] Case Management Department  Written by: Gulshan Dickson RN    Patient Name: Alejandra Villalba  Attending Provider: Jaya Ramon MD  Admit Date: 7/15/2018  2:28 AM  MRN: [id number]  Account: [de-identified]                     : 1970  Discharge Date: 2018      Disposition: home independently. 2nd IMM signed.     Gulshan Dickson RN

## 2018-07-19 NOTE — PROGRESS NOTES
[facility]JO, [facility] COPD (chronic obstructive pulmonary disease) ([facility].) [J44.9] . Vitals:    07/19/18 0800   BP: 120/62   Pulse: 99   Resp: 20   Temp: 98.1 °F (36.7 °C)   SpO2: 92%   . Patients home meds are   Prior to Admission medications    Medication Sig Start Date End Date Taking?  Authorizing Provider   ferrous sulfate 325 (65 Fe) MG EC tablet Take 1 tablet by mouth 2 times daily (with meals) 7/19/18  Yes Marcella Chu MD   amoxicillin-clavulanate (AUGMENTIN) 875-125 MG per tablet Take 1 tablet by mouth every 12 hours for 14 days 7/19/18 8/2/18 Yes Marcella Chu MD   predniSONE (DELTASONE) 20 MG tablet Take two tabs by mouth daily for 3 days, followed by one tab by mouth daily for 3 days, followed by half tablet by mouth daily for 3 days 7/19/18  Yes Marcella Chu MD   budesonide (PULMICORT) 0.5 MG/2ML nebulizer suspension Take 2 mLs by nebulization 2 times daily 7/19/18  Yes Marcella Chu MD   ipratropium-albuterol (DUONEB) 0.5-2.5 (3) MG/3ML SOLN nebulizer solution Inhale 3 mLs into the lungs every 6 hours as needed for Shortness of Breath 7/19/18  Yes Marcella Chu MD   lurasidone (LATUDA) 40 MG TABS tablet Take 40 mg by mouth daily   Yes Historical Provider, MD   escitalopram (LEXAPRO) 20 MG tablet Take 20 mg by mouth daily   Yes Historical Provider, MD   albuterol (PROVENTIL) (2.5 MG/3ML) 0.083% nebulizer solution Take 3 mLs by nebulization 4 times daily 5/9/18 6/8/18  Mohsen Hogue MD   Arformoterol Tartrate (BROVANA) 15 MCG/2ML NEBU Take 1 ampule by nebulization 2 times daily    Historical Provider, MD   .  Recent Surgical History:none  Assessment BS scattered heeze    RR16  Breath Sounds:scattered wheeze      · Bronchodilator assessment at level 3Hyperinflation assessment at level   · Secretion Management assessment at level    ·   · []    Bronchodilator Assessment  BRONCHODILATOR ASSESSMENT SCORE  Score 0 1 2 3 4 5   Breath Sounds   []  Patient Baseline []  No Wheeze good aeration []  Faint, scattered wheezing, good aeration [x]  Expiratory Wheezing and or moderately diminished []  Insp/Exp wheeze and/or very diminished []  Insp/Exp and/ or marked distress   Respiratory Rate   []  Patient Baseline []  Less than 20 [x]  Less than 20 []  20-25 []  Greater than 25 []  Greater than 25   Peak flow % of Pred or PB []  NA   []  Greater than 90%  []  81-90% []  71-80% []  Less than or equal to 70%  or unable to perform []  Unable due to Respiratory Distress   Dyspnea re []  Patient Baseline []  No SOB [x]  No SOB []  SOB on exertion []  SOB min activity []  At rest/acute   e FEV% Predicted       []  NA []  Above 69%  []  Unable []  Above 60-69%  []  Unable []  Above 50-59%  []  Unable []  Above 35-49%  []  Unable []  Less than 35%  []  Unable                 []  Hyperinflation Assessment  Score 1 2 3   CXR and Breath Sounds   []  Clear []  No atelectasis  Basilar aeration []  Atelectasis or absent basilar breath sounds   Incentive Spirometry Volume  (Per IBW)   []  Greater than or equal to 15ml/Kg []  less than 15ml/Kg []  less than 15ml/Kg   Surgery within last 2 weeks []  None or general   []  Abdominal or thoracic surgery  []  Abdominal or thoracic   Chronic Pulmonary Historyre []  No []  Yes []  Yes     []  Secretion Management Assessment  Score 1 2 3   Bilateral Breath Sounds   []  Occasional Rhonchi []  Scattered Rhonchi []  Course Rhonchi and/or poor aeration   Sputum    []  Small amount of thin secretions []  Moderate amount of viscous secretions []  Copius, Viscious Yellow/ Secretions   CXR as reported by physician []  clear  []  Unavailable []  Infiltrates and/or consolidation  []  Unavailable []  Mucus Plugging and or lobar consolidation  []  Unavailable   Cough []  Strong, productive cough []  Weak productive cough []  No cough or weak non-productive cough   NICHOLAS CAMEJO  10:26 AM                            FEMALE                                  MALE FEV1 Predicted Normal Values                        FEV1 Predicted Normal Values          Age                                     Height in Feet and Inches       Age                                     Height in Feet and Inches       4' 11\" 5' 1\" 5' 3\" 5' 5\" 5' 7\" 5' 9\" 5' 11\" 6' 1\"  4' 11\" 5' 1\" 5' 3\" 5' 5\" 5' 7\" 5' 9\" 5' 11\" 6' 1\"   43 - 45 2.49 2.66 2.84 3.03 3.22 3.42 3.62 3.83 42 - 45 2.82 3.03 3.26 3.49 3.72 3.96 4.22 4.47   46 - 49 2.40 2.57 2.76 2.94 3.14 3.33 3.54 3.75 46 - 49 2.70 2.92 3.14 3.37 3.61 3.85 4.10 4.36   50 - 53 2.31 2.48 2.66 2.85 3.04 3.24 3.45 3.66 50 - 53 2.58 2.80 3.02 3.25 3.49 3.73 3.98 4.24   54 - 57 2.21 2.38 2.57 2.75 2.95 3.14 3.35 3.56 54 - 57 2.46 2.67 2.89 3.12 3.36 3.60 3.85 4.11   58 - 61 2.10 2.28 2.46 2.65 2.84 3.04 3.24 3.45 58 - 61 2.32 2.54 2.76 2.99 3.23 3.47 3.72 3.98   62 - 65 1.99 2.17 2.35 2.54 2.73 2.93 3.13 3.34 62 - 65 2.19 2.40 2.62 2.85 3.09 3.33 3.58 3.84   66 - 69 1.88 2.05 2.23 2.42 2.61 2.81 3.02 3.23 66 - 69 2.04 2.26 2.48 2.71 2.95 3.19 3.44 3.70   70+ 1.82 1.99 2.17 2.36 2.55 2.75 2.95 3.16 70+ 1.97 2.19 2.41 2.64 2.87 3.12 3.37 3.62             Predicted Peak Expiratory Flow Rate                                       Height (in)  Female       Height (in) Male           Age 80 98 66 52 29 48 78 74 Age            30 193 903 222 616 660 468 568 410  58 75 17 54 28 95 31 33 76   25 337 352 366 381 396 411 426 441 25 447 476 505 533 562 591 619 648 677   30 329 344 359 374 389 404 419 434 30 437 466 494 523 552 580 609 638 667   35 322 337 351 366 381 396 411 426 35 426 455 484 512 541 570 598 627 657   40 314 329 344 359 374 389 404 419 40 416 445 473 502 531 559 588 617 647   45 307 322 336 351 366 381 396 411 45 405 434 463 491 520 549 577 606 636   50 299 314 329 344 359 374 389 404 50 395 424 452 481 510 538 567 596 625   55 292 307 321 336 351 366 381 396 55 384 413 442 470 499 528 556 585 615   60 284 299 314 329 344 359 374 213 07 530 268 824 325 981 241 833 912 078   59 277 292 306 321 336 351 366 381 65 363 392 421 449 478 507 535 564 594   70 269 284 299 314 329 344 359 374 70 353 382 410 439 468 496 525 554 583   75 261 274 289 305 319 334 348 364 75 344 372 400 429 458 487 515 544 573   80 253 266 282 296 312 327 342 356 80 335 362 390 419 448 476 505 534 562           BRONCHOSPASM/BRONCHOCONSTRICTION     [x]         IMPROVE AERATION/BREATH SOUNDS  [x]   ADMINISTER BRONCHODILATOR THERAPY AS APPROPRIATE  [x]   ASSESS BREATH SOUNDS  [x]   IMPLEMENT AEROSOL/MDI PROTOCOL  [x]   PATIENT EDUCATION AS NEEDED

## 2018-07-20 NOTE — PROGRESS NOTES
PULMONARY PROGRESS NOTE      Patient:  Eugenia Mcbride  YOB: 1970    MRN: [id number]     Acct: [de-identified]     Admit date: 7/15/2018    REASON FOR CONSULT:- Lower respiratory tract infection. History of bronchiectasis. Pt seen and Chart reviewed. Patient remained afebrile over the last 24 hours. Shortness of breath is improving. Clear sputum production. Patient had leukocytosis likely secondary to Solu-Medrol. No hemoptysis    Subjective:   Review of Systems -   General ROS: Completed and except as mentioned above were negative   Respiratory ROS:  Shortness of breath and sputum production but improving since admission. Cardiovascular ROS:  Completed and except as mentioned above were negative  Gastrointestinal ROS: Completed and except as mentioned above were negative  Genito-Urinary ROS:  Completed and except as mentioned above were negative  Musculoskeletal ROS:  Completed and except as mentioned above were negative  Neurological ROS:  Completed and except as mentioned above were negative  Dermatological ROS:  Completed and except as mentioned above were negative      Diet:  Dietary Nutrition Supplements: Standard High Calorie Oral Supplement      Medications:Current Inpatient    Scheduled Meds:    Continuous Infusions:    PRN Meds:    Objective:      Physical Exam:  Vitals: /62   Pulse 99   Temp 98.1 °F (36.7 °C) (Oral)   Resp 20   Ht 5' 5\" (1.651 m)   Wt 121 lb (54.9 kg)   SpO2 92%   BMI 20.14 kg/m²   24 hour intake/output:No intake or output data in the 24 hours ending 07/19/18 2051  Last 3 weights:   Wt Readings from Last 3 Encounters:   07/18/18 121 lb (54.9 kg)   02/19/18 121 lb (54.9 kg)   02/19/18 121 lb (54.9 kg)         Physical Examination:   PHYSICAL EXAMINATION:  Vitals:    07/18/18 2332 07/19/18 0408 07/19/18 0456 07/19/18 0800   BP:  (!) 113/58  120/62   Pulse:  88  99   Resp: 24 22 18 20 Temp:  98.2 °F (36.8 °C)  98.1 °F (36.7 °C)   TempSrc:  Oral  Oral   SpO2:    92%   Weight:       Height:         Constitutional: This is a well developed, well nourished. Respiratory: Chest was symmetrical without dullness to percussion. Decreased breath sound bilaterally with prolonged expiration and expiratory wheezing bilaterally. This is improving. There is no intercostal retraction or use of accessory muscles. No egophony noted. Cardiovascular: Regular without murmur, clicks, gallops or rubs. Abdomen: Slightly rounded and soft without organomegaly. No rebound tenderness, rigidity or guarding was appreciated. Lymphatic: No lymphadenopathy. Musculoskeletal: Normal curvature of the spine. No gross muscle weakness. Extremities:  No lower extremity edema, ulcerations, tenderness, varicosities or erythema. Muscle size, tone and strength are normal.  No involuntary movements are noted. Skin:  Warm and dry. Good color, turgor and pigmentation. No lesions or scars. No cyanosis or clubbing  Neurological/Psychiatric: The patient's general behavior, level of consciousness, thought content and emotional status is normal.          CBC:   Recent Labs      07/18/18   0819   WBC  32.3*   HGB  11.4*   PLT  932*     BMP:  No results for input(s): NA, K, CL, CO2, BUN, CREATININE, GLUCOSE in the last 72 hours. Calcium:  No results for input(s): CALCIUM in the last 72 hours. Ionized Calcium:No results for input(s): IONCA in the last 72 hours. Magnesium:No results for input(s): MG in the last 72 hours. Phosphorus:No results for input(s): PHOS in the last 72 hours. BNP:No results for input(s): BNP in the last 72 hours. Glucose:No results for input(s): POCGLU in the last 72 hours. HgbA1C: No results for input(s): LABA1C in the last 72 hours. INR: No results for input(s): INR in the last 72 hours.   Hepatic: No results for input(s): ALKPHOS, ALT, AST, PROT, BILITOT, BILIDIR, LABALBU in the last 72

## 2018-07-21 LAB
CULTURE: NORMAL
Lab: NORMAL
SPECIMEN DESCRIPTION: NORMAL
STATUS: NORMAL

## 2018-08-07 PROBLEM — J44.1 COPD EXACERBATION (HCC): Status: RESOLVED | Noted: 2018-07-14 | Resolved: 2018-08-07

## 2018-08-07 NOTE — TELEPHONE ENCOUNTER
Patient was in the hospital and prescribed these meds. Pharmacy will not fill because ICD-10 code not on RX. If you want to fill please sign.

## 2018-08-08 RX ORDER — IPRATROPIUM BROMIDE AND ALBUTEROL SULFATE 2.5; .5 MG/3ML; MG/3ML
1 SOLUTION RESPIRATORY (INHALATION) EVERY 6 HOURS PRN
Qty: 360 ML | Refills: 3 | Status: SHIPPED | OUTPATIENT
Start: 2018-08-08 | End: 2018-08-29 | Stop reason: SDUPTHER

## 2018-08-08 RX ORDER — LANOLIN ALCOHOL/MO/W.PET/CERES
325 CREAM (GRAM) TOPICAL 2 TIMES DAILY
Qty: 60 TABLET | Refills: 1 | Status: CANCELLED | OUTPATIENT
Start: 2018-08-08 | End: 2018-09-07

## 2018-08-08 RX ORDER — BUDESONIDE 0.5 MG/2ML
1 INHALANT ORAL 2 TIMES DAILY
Qty: 60 AMPULE | Refills: 3 | Status: SHIPPED | OUTPATIENT
Start: 2018-08-08

## 2018-08-23 PROBLEM — E27.9 LESION OF ADRENAL GLAND (HCC): Status: ACTIVE | Noted: 2018-01-29

## 2018-08-23 PROBLEM — F31.30 BIPOLAR I DISORDER, MOST RECENT EPISODE DEPRESSED (HCC): Status: ACTIVE | Noted: 2018-03-19

## 2018-08-23 PROBLEM — N28.1 RENAL CYST: Status: ACTIVE | Noted: 2018-04-25

## 2018-08-23 PROBLEM — F17.200 SMOKING: Status: ACTIVE | Noted: 2018-01-29

## 2018-08-23 PROBLEM — F51.02 INSOMNIA DUE TO PSYCHOLOGICAL STRESS: Status: ACTIVE | Noted: 2018-03-19

## 2018-08-23 PROBLEM — R31.9 HEMATURIA: Status: ACTIVE | Noted: 2018-04-25

## 2018-08-23 PROBLEM — Q62.0 PRIMARY HYDRONEPHROSIS: Status: ACTIVE | Noted: 2018-04-25

## 2018-08-23 PROBLEM — R31.29 MICROSCOPIC HEMATURIA: Status: ACTIVE | Noted: 2018-01-29

## 2018-08-23 PROBLEM — F41.0 PANIC DISORDER: Status: ACTIVE | Noted: 2018-03-19

## 2018-08-23 PROBLEM — N32.81 OVERACTIVE BLADDER: Status: ACTIVE | Noted: 2018-01-29

## 2018-08-28 PROBLEM — J44.9 CHRONIC OBSTRUCTIVE PULMONARY DISEASE (HCC): Status: ACTIVE | Noted: 2018-08-28

## 2018-08-29 ENCOUNTER — OFFICE VISIT (OUTPATIENT)
Dept: PULMONOLOGY | Age: 48
End: 2018-08-29
Payer: MEDICARE

## 2018-08-29 VITALS
HEIGHT: 65 IN | TEMPERATURE: 97 F | HEART RATE: 89 BPM | RESPIRATION RATE: 16 BRPM | BODY MASS INDEX: 18.99 KG/M2 | DIASTOLIC BLOOD PRESSURE: 60 MMHG | OXYGEN SATURATION: 90 % | WEIGHT: 114 LBS | SYSTOLIC BLOOD PRESSURE: 91 MMHG

## 2018-08-29 DIAGNOSIS — J44.9 CHRONIC OBSTRUCTIVE PULMONARY DISEASE, UNSPECIFIED COPD TYPE (HCC): Primary | ICD-10-CM

## 2018-08-29 DIAGNOSIS — F17.200 SMOKER: ICD-10-CM

## 2018-08-29 DIAGNOSIS — J47.9 POST-INFECTIVE BRONCHIECTASIS (HCC): ICD-10-CM

## 2018-08-29 PROCEDURE — G8420 CALC BMI NORM PARAMETERS: HCPCS | Performed by: INTERNAL MEDICINE

## 2018-08-29 PROCEDURE — 3023F SPIROM DOC REV: CPT | Performed by: INTERNAL MEDICINE

## 2018-08-29 PROCEDURE — 3017F COLORECTAL CA SCREEN DOC REV: CPT | Performed by: INTERNAL MEDICINE

## 2018-08-29 PROCEDURE — G8926 SPIRO NO PERF OR DOC: HCPCS | Performed by: INTERNAL MEDICINE

## 2018-08-29 PROCEDURE — 99214 OFFICE O/P EST MOD 30 MIN: CPT | Performed by: INTERNAL MEDICINE

## 2018-08-29 PROCEDURE — 4004F PT TOBACCO SCREEN RCVD TLK: CPT | Performed by: INTERNAL MEDICINE

## 2018-08-29 PROCEDURE — G8427 DOCREV CUR MEDS BY ELIG CLIN: HCPCS | Performed by: INTERNAL MEDICINE
